# Patient Record
Sex: FEMALE | Race: WHITE | NOT HISPANIC OR LATINO | Employment: FULL TIME | RURAL
[De-identification: names, ages, dates, MRNs, and addresses within clinical notes are randomized per-mention and may not be internally consistent; named-entity substitution may affect disease eponyms.]

---

## 2017-01-18 ENCOUNTER — LAB (OUTPATIENT)
Dept: FAMILY MEDICINE CLINIC | Facility: CLINIC | Age: 57
End: 2017-01-18

## 2017-01-18 DIAGNOSIS — E55.9 UNSPECIFIED VITAMIN D DEFICIENCY: ICD-10-CM

## 2017-01-18 DIAGNOSIS — Z00.00 ROUTINE GENERAL MEDICAL EXAMINATION AT A HEALTH CARE FACILITY: Primary | ICD-10-CM

## 2017-01-19 LAB
25(OH)D3+25(OH)D2 SERPL-MCNC: 44.9 NG/ML (ref 30–100)
ALBUMIN SERPL-MCNC: 4.8 G/DL (ref 3.5–5)
ALBUMIN/GLOB SERPL: 1.5 G/DL (ref 1.1–2.5)
ALP SERPL-CCNC: 74 U/L (ref 24–120)
ALT SERPL-CCNC: 49 U/L (ref 0–54)
AST SERPL-CCNC: 38 U/L (ref 7–45)
BASOPHILS # BLD AUTO: 0.01 10*3/MM3 (ref 0–0.2)
BASOPHILS NFR BLD AUTO: 0.1 % (ref 0–2)
BILIRUB SERPL-MCNC: 0.5 MG/DL (ref 0.1–1)
BUN SERPL-MCNC: 27 MG/DL (ref 5–21)
BUN/CREAT SERPL: 41.5 (ref 7–25)
CALCIUM SERPL-MCNC: 9.8 MG/DL (ref 8.4–10.4)
CHLORIDE SERPL-SCNC: 97 MMOL/L (ref 98–110)
CHOLEST SERPL-MCNC: 180 MG/DL (ref 130–200)
CO2 SERPL-SCNC: 30 MMOL/L (ref 24–31)
CREAT SERPL-MCNC: 0.65 MG/DL (ref 0.5–1.4)
DIFFERENTIAL COMMENT: NORMAL
EOSINOPHIL # BLD AUTO: 0.11 10*3/MM3 (ref 0–0.7)
EOSINOPHIL NFR BLD AUTO: 1.6 % (ref 0–4)
ERYTHROCYTE [DISTWIDTH] IN BLOOD BY AUTOMATED COUNT: 14.2 % (ref 12–15)
GLOBULIN SER CALC-MCNC: 3.1 GM/DL
GLUCOSE SERPL-MCNC: 93 MG/DL (ref 70–100)
HCT VFR BLD AUTO: 45.6 % (ref 37–47)
HDLC SERPL-MCNC: 76 MG/DL
HGB BLD-MCNC: 14.4 G/DL (ref 12–16)
IMM GRANULOCYTES # BLD: 0.01 10*3/MM3 (ref 0–0.03)
IMM GRANULOCYTES NFR BLD: 0.1 % (ref 0–5)
LDLC SERPL CALC-MCNC: 92 MG/DL (ref 0–99)
LYMPHOCYTES # BLD AUTO: 2.89 10*3/MM3 (ref 0.72–4.86)
LYMPHOCYTES NFR BLD AUTO: 43.1 % (ref 15–45)
MCH RBC QN AUTO: 29.5 PG (ref 28–32)
MCHC RBC AUTO-ENTMCNC: 31.6 G/DL (ref 33–36)
MCV RBC AUTO: 93.4 FL (ref 82–98)
MONOCYTES # BLD AUTO: 0.43 10*3/MM3 (ref 0.19–1.3)
MONOCYTES NFR BLD AUTO: 6.4 % (ref 4–12)
NEUTROPHILS # BLD AUTO: 3.25 10*3/MM3 (ref 1.87–8.4)
NEUTROPHILS NFR BLD AUTO: 48.7 % (ref 39–78)
NRBC BLD AUTO-RTO: 0 /100 WBC (ref 0–0)
PLATELET # BLD AUTO: 283 10*3/MM3 (ref 130–400)
PLATELET BLD QL SMEAR: NORMAL
POTASSIUM SERPL-SCNC: 5.2 MMOL/L (ref 3.5–5.3)
PROT SERPL-MCNC: 7.9 G/DL (ref 6.3–8.7)
RBC # BLD AUTO: 4.88 10*6/MM3 (ref 4.2–5.4)
RBC MORPH BLD: NORMAL
SODIUM SERPL-SCNC: 139 MMOL/L (ref 135–145)
T4 FREE SERPL-MCNC: 1.25 NG/DL (ref 0.78–2.19)
TRIGL SERPL-MCNC: 62 MG/DL (ref 0–149)
TSH SERPL DL<=0.005 MIU/L-ACNC: 2.18 MIU/ML (ref 0.47–4.68)
VLDLC SERPL CALC-MCNC: 12.4 MG/DL
WBC # BLD AUTO: 6.7 10*3/MM3 (ref 4.8–10.8)

## 2017-01-27 ENCOUNTER — OFFICE VISIT (OUTPATIENT)
Dept: FAMILY MEDICINE CLINIC | Facility: CLINIC | Age: 57
End: 2017-01-27

## 2017-01-27 VITALS
WEIGHT: 162.2 LBS | HEIGHT: 63 IN | HEART RATE: 76 BPM | DIASTOLIC BLOOD PRESSURE: 77 MMHG | OXYGEN SATURATION: 98 % | TEMPERATURE: 97.7 F | SYSTOLIC BLOOD PRESSURE: 125 MMHG | BODY MASS INDEX: 28.74 KG/M2

## 2017-01-27 DIAGNOSIS — Z12.31 SCREENING MAMMOGRAM, ENCOUNTER FOR: ICD-10-CM

## 2017-01-27 DIAGNOSIS — Z86.79 HISTORY OF RHEUMATIC FEVER: ICD-10-CM

## 2017-01-27 DIAGNOSIS — Z80.0 FAMILY HISTORY OF COLON CANCER: ICD-10-CM

## 2017-01-27 DIAGNOSIS — Z00.00 ANNUAL PHYSICAL EXAM: Primary | ICD-10-CM

## 2017-01-27 PROBLEM — N95.2 POSTMENOPAUSAL ATROPHIC VAGINITIS: Status: ACTIVE | Noted: 2017-01-27

## 2017-01-27 PROBLEM — E78.2 MIXED HYPERLIPIDEMIA: Status: ACTIVE | Noted: 2017-01-27

## 2017-01-27 PROBLEM — I10 ESSENTIAL HYPERTENSION: Status: ACTIVE | Noted: 2017-01-27

## 2017-01-27 PROBLEM — M41.20 IDIOPATHIC SCOLIOSIS: Status: ACTIVE | Noted: 2017-01-27

## 2017-01-27 LAB
BILIRUB BLD-MCNC: NEGATIVE MG/DL
CLARITY, POC: CLEAR
COLOR UR: YELLOW
GLUCOSE UR STRIP-MCNC: NEGATIVE MG/DL
KETONES UR QL: NEGATIVE
LEUKOCYTE EST, POC: NEGATIVE
NITRITE UR-MCNC: NEGATIVE MG/ML
PH UR: 6.5 [PH] (ref 5–8)
PROT UR STRIP-MCNC: NEGATIVE MG/DL
RBC # UR STRIP: NEGATIVE /UL
SP GR UR: 1.01 (ref 1–1.03)
UROBILINOGEN UR QL: NORMAL

## 2017-01-27 PROCEDURE — 81003 URINALYSIS AUTO W/O SCOPE: CPT | Performed by: FAMILY MEDICINE

## 2017-01-27 PROCEDURE — 99396 PREV VISIT EST AGE 40-64: CPT | Performed by: FAMILY MEDICINE

## 2017-01-27 RX ORDER — OMEPRAZOLE 20 MG/1
CAPSULE, DELAYED RELEASE ORAL
COMMUNITY
Start: 2016-11-03 | End: 2018-03-29 | Stop reason: SDUPTHER

## 2017-01-27 RX ORDER — LISINOPRIL 5 MG/1
TABLET ORAL
COMMUNITY
Start: 2016-12-24 | End: 2017-03-22 | Stop reason: SDUPTHER

## 2017-01-27 RX ORDER — PRAVASTATIN SODIUM 20 MG
TABLET ORAL
COMMUNITY
Start: 2017-01-06 | End: 2017-04-04 | Stop reason: SDUPTHER

## 2017-01-27 RX ORDER — ESTRADIOL 10 UG/1
INSERT VAGINAL
COMMUNITY
Start: 2016-10-30 | End: 2017-12-29 | Stop reason: SDUPTHER

## 2017-01-27 RX ORDER — METHOCARBAMOL 500 MG/1
500 TABLET, FILM COATED ORAL
COMMUNITY
End: 2017-03-06 | Stop reason: SDUPTHER

## 2017-01-27 RX ORDER — MELATONIN
1000 DAILY
COMMUNITY

## 2017-01-27 RX ORDER — CHROMIUM 200 MCG
TABLET ORAL
COMMUNITY
End: 2018-05-04

## 2017-01-27 RX ORDER — MECLIZINE HYDROCHLORIDE 25 MG/1
TABLET ORAL
COMMUNITY
Start: 2016-11-02 | End: 2017-06-20 | Stop reason: SDUPTHER

## 2017-01-27 NOTE — MR AVS SNAPSHOT
Ronit Robbins   1/27/2017 1:00 PM   Office Visit    Dept Phone:  [phone]   Encounter #:  [id number]    Provider:  Dmitriy Awad MD   Department:  [facility]                Your Full Care Plan              Your Updated Medication List          This list is accurate as of: 1/27/17  1:37 PM.  Always use your most recent med list.                cholecalciferol 1000 UNITS tablet   Commonly known as:  VITAMIN D3       Chromium 200 MCG tablet       lisinopril 5 MG tablet   Commonly known as:  PRINIVIL,ZESTRIL       meclizine 25 MG tablet   Commonly known as:  ANTIVERT       metFORMIN 500 MG tablet   Commonly known as:  GLUCOPHAGE       methocarbamol 500 MG tablet   Commonly known as:  ROBAXIN       omeprazole 20 MG capsule   Commonly known as:  priLOSEC       pravastatin 20 MG tablet   Commonly known as:  PRAVACHOL       VAGIFEM 10 MCG tablet vaginal tablet   Generic drug:  estradiol               We Performed the Following     Ambulatory Referral to Cardiology     Ambulatory Referral to General Surgery     POC Urinalysis Dipstick, Automated       You Were Diagnosed With        Codes Comments    Annual physical exam    -  Primary ICD-10-CM: Z00.00  ICD-9-CM: V70.0     Family history of colon cancer     ICD-10-CM: Z80.0  ICD-9-CM: V16.0     History of rheumatic fever     ICD-10-CM: Z86.79  ICD-9-CM: V12.09       Instructions      Preventive Care for Adults, Female  A healthy lifestyle and preventive care can promote health and wellness. Preventive health guidelines for women include the following key practices.  · A routine yearly physical is a good way to check with your health care provider about your health and preventive screening. It is a chance to share any concerns and updates on your health and to receive a thorough exam.  · Visit your dentist for a routine exam and preventive care every 6 months. Brush your teeth twice a day and floss once a  day. Good oral hygiene prevents tooth decay and gum disease.  · The frequency of eye exams is based on your age, health, family medical history, use of contact lenses, and other factors. Follow your health care provider's recommendations for frequency of eye exams.  · Eat a healthy diet. Foods like vegetables, fruits, whole grains, low-fat dairy products, and lean protein foods contain the nutrients you need without too many calories. Decrease your intake of foods high in solid fats, added sugars, and salt. Eat the right amount of calories for you. Get information about a proper diet from your health care provider, if necessary.  · Regular physical exercise is one of the most important things you can do for your health. Most adults should get at least 150 minutes of moderate-intensity exercise (any activity that increases your heart rate and causes you to sweat) each week. In addition, most adults need muscle-strengthening exercises on 2 or more days a week.  · Maintain a healthy weight. The body mass index (BMI) is a screening tool to identify possible weight problems. It provides an estimate of body fat based on height and weight. Your health care provider can find your BMI and can help you achieve or maintain a healthy weight. For adults 20 years and older:    A BMI below 18.5 is considered underweight.    A BMI of 18.5 to 24.9 is normal.    A BMI of 25 to 29.9 is considered overweight.    A BMI of 30 and above is considered obese.  · Maintain normal blood lipids and cholesterol levels by exercising and minimizing your intake of saturated fat. Eat a balanced diet with plenty of fruit and vegetables. Blood tests for lipids and cholesterol should begin at age 20 and be repeated every 5 years. If your lipid or cholesterol levels are high, you are over 50, or you are at high risk for heart disease, you may need your cholesterol levels checked more frequently. Ongoing high lipid and cholesterol levels should be  treated with medicines if diet and exercise are not working.  · If you smoke, find out from your health care provider how to quit. If you do not use tobacco, do not start.  · Lung cancer screening is recommended for adults aged 55-80 years who are at high risk for developing lung cancer because of a history of smoking. A yearly low-dose CT scan of the lungs is recommended for people who have at least a 30-pack-year history of smoking and are a current smoker or have quit within the past 15 years. A pack year of smoking is smoking an average of 1 pack of cigarettes a day for 1 year (for example: 1 pack a day for 30 years or 2 packs a day for 15 years). Yearly screening should continue until the smoker has stopped smoking for at least 15 years. Yearly screening should be stopped for people who develop a health problem that would prevent them from having lung cancer treatment.  · If you are pregnant, do not drink alcohol. If you are breastfeeding, be very cautious about drinking alcohol. If you are not pregnant and choose to drink alcohol, do not have more than 1 drink per day. One drink is considered to be 12 ounces (355 mL) of beer, 5 ounces (148 mL) of wine, or 1.5 ounces (44 mL) of liquor.  · Avoid use of street drugs. Do not share needles with anyone. Ask for help if you need support or instructions about stopping the use of drugs.  · High blood pressure causes heart disease and increases the risk of stroke. Your blood pressure should be checked at least every 1 to 2 years. Ongoing high blood pressure should be treated with medicines if weight loss and exercise do not work.  · If you are 55-79 years old, ask your health care provider if you should take aspirin to prevent strokes.  · Diabetes screening is done by taking a blood sample to check your blood glucose level after you have not eaten for a certain period of time (fasting). If you are not overweight and you do not have risk factors for diabetes, you should  "be screened once every 3 years starting at age 45. If you are overweight or obese and you are 40-70 years of age, you should be screened for diabetes every year as part of your cardiovascular risk assessment.  · Breast cancer screening is essential preventive care for women. You should practice \"breast self-awareness.\" This means understanding the normal appearance and feel of your breasts and may include breast self-examination. Any changes detected, no matter how small, should be reported to a health care provider. Women in their 20s and 30s should have a clinical breast exam (CBE) by a health care provider as part of a regular health exam every 1 to 3 years. After age 40, women should have a CBE every year. Starting at age 40, women should consider having a mammogram (breast X-ray test) every year. Women who have a family history of breast cancer should talk to their health care provider about genetic screening. Women at a high risk of breast cancer should talk to their health care providers about having an MRI and a mammogram every year.  · Breast cancer gene (BRCA)-related cancer risk assessment is recommended for women who have family members with BRCA-related cancers. BRCA-related cancers include breast, ovarian, tubal, and peritoneal cancers. Having family members with these cancers may be associated with an increased risk for harmful changes (mutations) in the breast cancer genes BRCA1 and BRCA2. Results of the assessment will determine the need for genetic counseling and BRCA1 and BRCA2 testing.  · Your health care provider may recommend that you be screened regularly for cancer of the pelvic organs (ovaries, uterus, and vagina). This screening involves a pelvic examination, including checking for microscopic changes to the surface of your cervix (Pap test). You may be encouraged to have this screening done every 3 years, beginning at age 21.    For women ages 30-65, health care providers may recommend " pelvic exams and Pap testing every 3 years, or they may recommend the Pap and pelvic exam, combined with testing for human papilloma virus (HPV), every 5 years. Some types of HPV increase your risk of cervical cancer. Testing for HPV may also be done on women of any age with unclear Pap test results.    Other health care providers may not recommend any screening for nonpregnant women who are considered low risk for pelvic cancer and who do not have symptoms. Ask your health care provider if a screening pelvic exam is right for you.  · If you have had past treatment for cervical cancer or a condition that could lead to cancer, you need Pap tests and screening for cancer for at least 20 years after your treatment. If Pap tests have been discontinued, your risk factors (such as having a new sexual partner) need to be reassessed to determine if screening should resume. Some women have medical problems that increase the chance of getting cervical cancer. In these cases, your health care provider may recommend more frequent screening and Pap tests.  · Colorectal cancer can be detected and often prevented. Most routine colorectal cancer screening begins at the age of 50 years and continues through age 75 years. However, your health care provider may recommend screening at an earlier age if you have risk factors for colon cancer. On a yearly basis, your health care provider may provide home test kits to check for hidden blood in the stool. Use of a small camera at the end of a tube, to directly examine the colon (sigmoidoscopy or colonoscopy), can detect the earliest forms of colorectal cancer. Talk to your health care provider about this at age 50, when routine screening begins.  Direct exam of the colon should be repeated every 5-10 years through age 75 years, unless early forms of precancerous polyps or small growths are found.  · People who are at an increased risk for hepatitis B should be screened for this virus. You  are considered at high risk for hepatitis B if:    You were born in a country where hepatitis B occurs often. Talk with your health care provider about which countries are considered high risk.    Your parents were born in a high-risk country and you have not received a shot to protect against hepatitis B (hepatitis B vaccine).    You have HIV or AIDS.    You use needles to inject street drugs.    You live with, or have sex with, someone who has hepatitis B.    You get hemodialysis treatment.    You take certain medicines for conditions like cancer, organ transplantation, and autoimmune conditions.  · Hepatitis C blood testing is recommended for all people born from 1945 through 1965 and any individual with known risks for hepatitis C.  · Practice safe sex. Use condoms and avoid high-risk sexual practices to reduce the spread of sexually transmitted infections (STIs). STIs include gonorrhea, chlamydia, syphilis, trichomonas, herpes, HPV, and human immunodeficiency virus (HIV). Herpes, HIV, and HPV are viral illnesses that have no cure. They can result in disability, cancer, and death.  · You should be screened for sexually transmitted illnesses (STIs) including gonorrhea and chlamydia if:    You are sexually active and are younger than 24 years.    You are older than 24 years and your health care provider tells you that you are at risk for this type of infection.    Your sexual activity has changed since you were last screened and you are at an increased risk for chlamydia or gonorrhea. Ask your health care provider if you are at risk.  · If you are at risk of being infected with HIV, it is recommended that you take a prescription medicine daily to prevent HIV infection. This is called preexposure prophylaxis (PrEP). You are considered at risk if:    You are sexually active and do not regularly use condoms or know the HIV status of your partner(s).    You take drugs by injection.    You are sexually active with a  partner who has HIV.    Talk with your health care provider about whether you are at high risk of being infected with HIV. If you choose to begin PrEP, you should first be tested for HIV. You should then be tested every 3 months for as long as you are taking PrEP.  · Osteoporosis is a disease in which the bones lose minerals and strength with aging. This can result in serious bone fractures or breaks. The risk of osteoporosis can be identified using a bone density scan. Women ages 65 years and over and women at risk for fractures or osteoporosis should discuss screening with their health care providers. Ask your health care provider whether you should take a calcium supplement or vitamin D to reduce the rate of osteoporosis.  · Menopause can be associated with physical symptoms and risks. Hormone replacement therapy is available to decrease symptoms and risks. You should talk to your health care provider about whether hormone replacement therapy is right for you.  · Use sunscreen. Apply sunscreen liberally and repeatedly throughout the day. You should seek shade when your shadow is shorter than you. Protect yourself by wearing long sleeves, pants, a wide-brimmed hat, and sunglasses year round, whenever you are outdoors.  · Once a month, do a whole body skin exam, using a mirror to look at the skin on your back. Tell your health care provider of new moles, moles that have irregular borders, moles that are larger than a pencil eraser, or moles that have changed in shape or color.  · Stay current with required vaccines (immunizations).    Influenza vaccine. All adults should be immunized every year.    Tetanus, diphtheria, and acellular pertussis (Td, Tdap) vaccine. Pregnant women should receive 1 dose of Tdap vaccine during each pregnancy. The dose should be obtained regardless of the length of time since the last dose. Immunization is preferred during the 27th-36th week of gestation. An adult who has not previously  received Tdap or who does not know her vaccine status should receive 1 dose of Tdap. This initial dose should be followed by tetanus and diphtheria toxoids (Td) booster doses every 10 years. Adults with an unknown or incomplete history of completing a 3-dose immunization series with Td-containing vaccines should begin or complete a primary immunization series including a Tdap dose. Adults should receive a Td booster every 10 years.    Varicella vaccine. An adult without evidence of immunity to varicella should receive 2 doses or a second dose if she has previously received 1 dose. Pregnant females who do not have evidence of immunity should receive the first dose after pregnancy. This first dose should be obtained before leaving the health care facility. The second dose should be obtained 4-8 weeks after the first dose.    Human papillomavirus (HPV) vaccine. Females aged 13-26 years who have not received the vaccine previously should obtain the 3-dose series. The vaccine is not recommended for use in pregnant females. However, pregnancy testing is not needed before receiving a dose. If a female is found to be pregnant after receiving a dose, no treatment is needed. In that case, the remaining doses should be delayed until after the pregnancy. Immunization is recommended for any person with an immunocompromised condition through the age of 26 years if she did not get any or all doses earlier. During the 3-dose series, the second dose should be obtained 4-8 weeks after the first dose. The third dose should be obtained 24 weeks after the first dose and 16 weeks after the second dose.    Zoster vaccine. One dose is recommended for adults aged 60 years or older unless certain conditions are present.    Measles, mumps, and rubella (MMR) vaccine. Adults born before 1957 generally are considered immune to measles and mumps. Adults born in 1957 or later should have 1 or more doses of MMR vaccine unless there is a  contraindication to the vaccine or there is laboratory evidence of immunity to each of the three diseases. A routine second dose of MMR vaccine should be obtained at least 28 days after the first dose for students attending postsecondary schools, health care workers, or international travelers. People who received inactivated measles vaccine or an unknown type of measles vaccine during [id number] should receive 2 doses of MMR vaccine. People who received inactivated mumps vaccine or an unknown type of mumps vaccine before 1979 and are at high risk for mumps infection should consider immunization with 2 doses of MMR vaccine. For females of childbearing age, rubella immunity should be determined. If there is no evidence of immunity, females who are not pregnant should be vaccinated. If there is no evidence of immunity, females who are pregnant should delay immunization until after pregnancy. Unvaccinated health care workers born before 1957 who lack laboratory evidence of measles, mumps, or rubella immunity or laboratory confirmation of disease should consider measles and mumps immunization with 2 doses of MMR vaccine or rubella immunization with 1 dose of MMR vaccine.    Pneumococcal 13-valent conjugate (PCV13) vaccine. When indicated, a person who is uncertain of his immunization history and has no record of immunization should receive the PCV13 vaccine. All adults 65 years of age and older should receive this vaccine. An adult aged 19 years or older who has certain medical conditions and has not been previously immunized should receive 1 dose of PCV13 vaccine. This PCV13 should be followed with a dose of pneumococcal polysaccharide (PPSV23) vaccine. Adults who are at high risk for pneumococcal disease should obtain the PPSV23 vaccine at least 8 weeks after the dose of PCV13 vaccine. Adults older than 65 years of age who have normal immune system function should obtain the PPSV23 vaccine dose at least 1 year after  the dose of PCV13 vaccine.    Pneumococcal polysaccharide (PPSV23) vaccine. When PCV13 is also indicated, PCV13 should be obtained first. All adults aged 65 years and older should be immunized. An adult younger than age 65 years who has certain medical conditions should be immunized. Any person who resides in a nursing home or long-term care facility should be immunized. An adult smoker should be immunized. People with an immunocompromised condition and certain other conditions should receive both PCV13 and PPSV23 vaccines. People with human immunodeficiency virus (HIV) infection should be immunized as soon as possible after diagnosis. Immunization during chemotherapy or radiation therapy should be avoided. Routine use of PPSV23 vaccine is not recommended for American Indians, [place] Natives, or people younger than 65 years unless there are medical conditions that require PPSV23 vaccine. When indicated, people who have unknown immunization and have no record of immunization should receive PPSV23 vaccine. One-time revaccination 5 years after the first dose of PPSV23 is recommended for people aged 19-64 years who have chronic kidney failure, nephrotic syndrome, asplenia, or immunocompromised conditions. People who received 1-2 doses of PPSV23 before age 65 years should receive another dose of PPSV23 vaccine at age 65 years or later if at least 5 years have passed since the previous dose. Doses of PPSV23 are not needed for people immunized with PPSV23 at or after age 65 years.    Meningococcal vaccine. Adults with asplenia or persistent complement component deficiencies should receive 2 doses of quadrivalent meningococcal conjugate (MenACWY-D) vaccine. The doses should be obtained at least 2 months apart. Microbiologists working with certain meningococcal bacteria,  recruits, people at risk during an outbreak, and people who travel to or live in countries with a high rate of meningitis should be immunized. A  first-year college student up through age 21 years who is living in a residence renee should receive a dose if she did not receive a dose on or after her 16th birthday. Adults who have certain high-risk conditions should receive one or more doses of vaccine.    Hepatitis A vaccine. Adults who wish to be protected from this disease, have certain high-risk conditions, work with hepatitis A-infected animals, work in hepatitis A research labs, or travel to or work in countries with a high rate of hepatitis A should be immunized. Adults who were previously unvaccinated and who anticipate close contact with an international adoptee during the first 60 days after arrival in the [place] from a country with a high rate of hepatitis A should be immunized.    Hepatitis B vaccine. Adults who wish to be protected from this disease, have certain high-risk conditions, may be exposed to blood or other infectious body fluids, are household contacts or sex partners of hepatitis B positive people, are clients or workers in certain care facilities, or travel to or work in countries with a high rate of hepatitis B should be immunized.    Haemophilus influenzae type b (Hib) vaccine. A previously unvaccinated person with asplenia or sickle cell disease or having a scheduled splenectomy should receive 1 dose of Hib vaccine. Regardless of previous immunization, a recipient of a hematopoietic stem cell transplant should receive a 3-dose series 6-12 months after her successful transplant. Hib vaccine is not recommended for adults with HIV infection.  Preventive Services / Frequency  Ages 19 to 39 years  · Blood pressure check.** / Every 3-5 years.  · Lipid and cholesterol check.** / Every 5 years beginning at age 20.  · Clinical breast exam.** / Every 3 years for women in their 20s and 30s.  · BRCA-related cancer risk assessment.** / For women who have family members with a BRCA-related cancer (breast, ovarian, tubal, or peritoneal  cancers).  · Pap test.** / Every 2 years from ages 21 through 29. Every 3 years starting at age 30 through age 65 or 70 with a history of 3 consecutive normal Pap tests.  · HPV screening.** / Every 3 years from ages 30 through ages 65 to 70 with a history of 3 consecutive normal Pap tests.  · Hepatitis C blood test.** / For any individual with known risks for hepatitis C.  · Skin self-exam. / Monthly.  · Influenza vaccine. / Every year.  · Tetanus, diphtheria, and acellular pertussis (Tdap, Td) vaccine.** / Consult your health care provider. Pregnant women should receive 1 dose of Tdap vaccine during each pregnancy. 1 dose of Td every 10 years.  · Varicella vaccine.** / Consult your health care provider. Pregnant females who do not have evidence of immunity should receive the first dose after pregnancy.  · HPV vaccine. / 3 doses over 6 months, if 26 and younger. The vaccine is not recommended for use in pregnant females. However, pregnancy testing is not needed before receiving a dose.  · Measles, mumps, rubella (MMR) vaccine.** / You need at least 1 dose of MMR if you were born in 1957 or later. You may also need a 2nd dose. For females of childbearing age, rubella immunity should be determined. If there is no evidence of immunity, females who are not pregnant should be vaccinated. If there is no evidence of immunity, females who are pregnant should delay immunization until after pregnancy.  · Pneumococcal 13-valent conjugate (PCV13) vaccine.** / Consult your health care provider.  · Pneumococcal polysaccharide (PPSV23) vaccine.** / 1 to 2 doses if you smoke cigarettes or if you have certain conditions.  · Meningococcal vaccine.** / 1 dose if you are age 19 to 21 years and a first-year college student living in a residence renee, or have one of several medical conditions, you need to get vaccinated against meningococcal disease. You may also need additional booster doses.  · Hepatitis A vaccine.** / Consult your  health care provider.  · Hepatitis B vaccine.** / Consult your health care provider.  · Haemophilus influenzae type b (Hib) vaccine.** / Consult your health care provider.  Ages 40 to 64 years  · Blood pressure check.** / Every year.  · Lipid and cholesterol check.** / Every 5 years beginning at age 20 years.  · Lung cancer screening. / Every year if you are aged 55-80 years and have a 30-pack-year history of smoking and currently smoke or have quit within the past 15 years. Yearly screening is stopped once you have quit smoking for at least 15 years or develop a health problem that would prevent you from having lung cancer treatment.  · Clinical breast exam.** / Every year after age 40 years.  ·  BRCA-related cancer risk assessment.** / For women who have family members with a BRCA-related cancer (breast, ovarian, tubal, or peritoneal cancers).  · Mammogram.** / Every year beginning at age 40 years and continuing for as long as you are in good health. Consult with your health care provider.  · Pap test.** / Every 3 years starting at age 30 years through age 65 or 70 years with a history of 3 consecutive normal Pap tests.  · HPV screening.** / Every 3 years from ages 30 years through ages 65 to 70 years with a history of 3 consecutive normal Pap tests.  · Fecal occult blood test (FOBT) of stool. / Every year beginning at age 50 years and continuing until age 75 years. You may not need to do this test if you get a colonoscopy every 10 years.  · Flexible sigmoidoscopy or colonoscopy.** / Every 5 years for a flexible sigmoidoscopy or every 10 years for a colonoscopy beginning at age 50 years and continuing until age 75 years.  · Hepatitis C blood test.** / For all people born from 1945 through 1965 and any individual with known risks for hepatitis C.  · Skin self-exam. / Monthly.  · Influenza vaccine. / Every year.  · Tetanus, diphtheria, and acellular pertussis (Tdap/Td) vaccine.** / Consult your health care provider.  Pregnant women should receive 1 dose of Tdap vaccine during each pregnancy. 1 dose of Td every 10 years.  · Varicella vaccine.** / Consult your health care provider. Pregnant females who do not have evidence of immunity should receive the first dose after pregnancy.  · Zoster vaccine.** / 1 dose for adults aged 60 years or older.  · Measles, mumps, rubella (MMR) vaccine.** / You need at least 1 dose of MMR if you were born in 1957 or later. You may also need a second dose. For females of childbearing age, rubella immunity should be determined. If there is no evidence of immunity, females who are not pregnant should be vaccinated. If there is no evidence of immunity, females who are pregnant should delay immunization until after pregnancy.  · Pneumococcal 13-valent conjugate (PCV13) vaccine.** / Consult your health care provider.  · Pneumococcal polysaccharide (PPSV23) vaccine.** / 1 to 2 doses if you smoke cigarettes or if you have certain conditions.  · Meningococcal vaccine.** / Consult your health care provider.  · Hepatitis A vaccine.** / Consult your health care provider.  · Hepatitis B vaccine.** / Consult your health care provider.  · Haemophilus influenzae type b (Hib) vaccine.** / Consult your health care provider.  Ages 65 years and over  · Blood pressure check.** / Every year.  · Lipid and cholesterol check.** / Every 5 years beginning at age 20 years.  · Lung cancer screening. / Every year if you are aged 55-80 years and have a 30-pack-year history of smoking and currently smoke or have quit within the past 15 years. Yearly screening is stopped once you have quit smoking for at least 15 years or develop a health problem that would prevent you from having lung cancer treatment.  · Clinical breast exam.** / Every year after age 40 years.  ·  BRCA-related cancer risk assessment.** / For women who have family members with a BRCA-related cancer (breast, ovarian, tubal, or peritoneal cancers).  · Mammogram.**  / Every year beginning at age 40 years and continuing for as long as you are in good health. Consult with your health care provider.  · Pap test.** / Every 3 years starting at age 30 years through age 65 or 70 years with 3 consecutive normal Pap tests. Testing can be stopped between 65 and 70 years with 3 consecutive normal Pap tests and no abnormal Pap or HPV tests in the past 10 years.  · HPV screening.** / Every 3 years from ages 30 years through ages 65 or 70 years with a history of 3 consecutive normal Pap tests. Testing can be stopped between 65 and 70 years with 3 consecutive normal Pap tests and no abnormal Pap or HPV tests in the past 10 years.  · Fecal occult blood test (FOBT) of stool. / Every year beginning at age 50 years and continuing until age 75 years. You may not need to do this test if you get a colonoscopy every 10 years.  · Flexible sigmoidoscopy or colonoscopy.** / Every 5 years for a flexible sigmoidoscopy or every 10 years for a colonoscopy beginning at age 50 years and continuing until age 75 years.  · Hepatitis C blood test.** / For all people born from 1945 through 1965 and any individual with known risks for hepatitis C.  · Osteoporosis screening.** / A one-time screening for women ages 65 years and over and women at risk for fractures or osteoporosis.  · Skin self-exam. / Monthly.  · Influenza vaccine. / Every year.  · Tetanus, diphtheria, and acellular pertussis (Tdap/Td) vaccine.** / 1 dose of Td every 10 years.  · Varicella vaccine.** / Consult your health care provider.  · Zoster vaccine.** / 1 dose for adults aged 60 years or older.  · Pneumococcal 13-valent conjugate (PCV13) vaccine.** / Consult your health care provider.  · Pneumococcal polysaccharide (PPSV23) vaccine.** / 1 dose for all adults aged 65 years and older.  · Meningococcal vaccine.** / Consult your health care provider.  · Hepatitis A vaccine.** / Consult your health care provider.  · Hepatitis B vaccine.** / Consult  "your health care provider.  · Haemophilus influenzae type b (Hib) vaccine.** / Consult your health care provider.  ** Family history and personal history of risk and conditions may change your health care provider's recommendations.     This information is not intended to replace advice given to you by your health care provider. Make sure you discuss any questions you have with your health care provider.     Document Released: 2003 Document Revised: 2016 Document Reviewed: 2012  Elsevier Interactive Patient Education  Elsevier [employer]       Patient Instructions History      Upcoming Appointments     Visit Type Date Time Department    PHYS[facility] 2017  1:00 PM [facility]     [facility] allows you to send messages to your doctor, view your test results, renew your prescriptions, schedule appointments, and more. To sign up, go to [employer] and click on the Sign Up Now link in the New User? box. Enter your [email or web address] Activation Code exactly as it appears below along with the last four digits of your Social Security Number and your Date of Birth () to complete the sign-up process. If you do not sign up before the expiration date, you must request a new code.    [email or web address] Activation Code: [email or web address]  Expires: 2017  8:25 AM    If you have questions, you can email [email or web address] or call [phone] to talk to our [email or web address] staff. Remember, [email or web address] is NOT to be used for urgent needs. For medical emergencies, dial 911.               Other Info from Your Visit           Allergies     No Known Allergies      Reason for Visit     Annual Exam           Vital Signs     Blood Pressure Pulse Temperature Height Weight Last Menstrual Period    125/77 76 97.7 °F (36.5 °C) 63\" (160 cm) 162 lb 3.2 oz (73.6 kg) (LMP Unknown)    Oxygen Saturation Body Mass Index Smoking Status             98% 28.73 kg/m2 Never Smoker         Problems and " Diagnoses Noted     High blood pressure    Curved spine    Mixed hyperlipidemia    Postmenopausal vaginal dryness    Annual physical exam    -  Primary    Family history of colon cancer        History of rheumatic fever          Results     POC Urinalysis Dipstick, Automated      Component Value Standard Range & Units    Color Yellow Yellow, Straw, Dark Yellow, Ximena    Clarity, UA Clear Clear    Glucose, UA Negative Negative, 1000 mg/dL (3+) mg/dL    Bilirubin Negative Negative    Ketones, UA Negative Negative    Specific Gravity  1.010 1.005 - 1.030    Blood, UA Negative Negative    pH, Urine 6.5 5.0 - 8.0    Protein, POC Negative Negative mg/dL    Urobilinogen, UA Normal Normal    Leukocytes Negative Negative    Nitrite, UA Negative Negative

## 2017-01-27 NOTE — PROGRESS NOTES
Subjective   Ronit Robbins is a 56 y.o. female.     Hyperlipidemia   This is a chronic problem. The current episode started more than 1 year ago. The problem is controlled. Recent lipid tests were reviewed and are normal. There are no known factors aggravating her hyperlipidemia. Pertinent negatives include no chest pain. Current antihyperlipidemic treatment includes statins. The current treatment provides moderate improvement of lipids. There are no compliance problems.  Risk factors for coronary artery disease include obesity.        The following portions of the patient's history were reviewed and updated as appropriate: allergies, current medications, past family history, past medical history, past surgical history and problem list.    Review of Systems   HENT: Negative.    Eyes: Negative.    Respiratory: Negative.    Cardiovascular: Negative for chest pain.   Gastrointestinal: Negative.    Genitourinary: Negative.    Musculoskeletal:        Scoliosis   Neurological: Negative.    Hematological: Negative.    Psychiatric/Behavioral: Negative.        Objective   Physical Exam   Constitutional: She is oriented to person, place, and time. She appears well-developed and well-nourished.   HENT:   Head: Normocephalic.   Right Ear: External ear normal.   Left Ear: External ear normal.   Mouth/Throat: Oropharynx is clear and moist.   Eyes: EOM are normal. Pupils are equal, round, and reactive to light.   Neck:   No neck masses good carotid pulses   Cardiovascular: Normal rate, regular rhythm and normal heart sounds.    Pulmonary/Chest: Effort normal and breath sounds normal.   Abdominal: Soft. Bowel sounds are normal.   No pulsatile mass or organomegaly   Genitourinary:   Genitourinary Comments: Breasts no masses noted   Musculoskeletal: Normal range of motion.   Neurological: She is alert and oriented to person, place, and time.   Skin: Skin is warm and dry.   Psychiatric: She has a normal mood and affect. Her behavior  is normal. Judgment and thought content normal.   Vitals reviewed.      Assessment/Plan   Problems Addressed this Visit     None      Visit Diagnoses     Annual physical exam    -  Primary    Relevant Orders    POC Urinalysis Dipstick, Automated (Completed)    Family history of colon cancer        Relevant Orders    Ambulatory Referral to General Surgery    History of rheumatic fever        Relevant Orders    Ambulatory Referral to Cardiology            Plan continues to fight weight control, history of rheumatic fever-cardiology consult, maternal aunt had colon cancer-referred for colonoscopy 8 years out

## 2017-02-10 LAB — HM MAMMOGRAM: NORMAL

## 2017-03-06 RX ORDER — METHOCARBAMOL 500 MG/1
500 TABLET, FILM COATED ORAL 3 TIMES DAILY
Qty: 270 TABLET | Refills: 2 | Status: SHIPPED | OUTPATIENT
Start: 2017-03-06 | End: 2018-10-16 | Stop reason: SDUPTHER

## 2017-03-22 RX ORDER — LISINOPRIL 5 MG/1
5 TABLET ORAL DAILY
Qty: 90 TABLET | Refills: 2 | Status: SHIPPED | OUTPATIENT
Start: 2017-03-22 | End: 2017-12-17 | Stop reason: SDUPTHER

## 2017-03-24 LAB — HM COLONOSCOPY: NORMAL

## 2017-04-04 RX ORDER — PRAVASTATIN SODIUM 20 MG
20 TABLET ORAL NIGHTLY
Qty: 90 TABLET | Refills: 2 | Status: SHIPPED | OUTPATIENT
Start: 2017-04-04 | End: 2018-01-02 | Stop reason: SDUPTHER

## 2017-06-20 RX ORDER — MECLIZINE HYDROCHLORIDE 25 MG/1
25 TABLET ORAL 3 TIMES DAILY PRN
Qty: 60 TABLET | Refills: 0 | Status: SHIPPED | OUTPATIENT
Start: 2017-06-20 | End: 2018-01-15 | Stop reason: SDUPTHER

## 2017-12-18 RX ORDER — LISINOPRIL 5 MG/1
TABLET ORAL
Qty: 90 TABLET | Refills: 0 | Status: SHIPPED | OUTPATIENT
Start: 2017-12-18 | End: 2018-03-18 | Stop reason: SDUPTHER

## 2017-12-20 ENCOUNTER — TELEPHONE (OUTPATIENT)
Dept: FAMILY MEDICINE CLINIC | Facility: CLINIC | Age: 57
End: 2017-12-20

## 2017-12-20 DIAGNOSIS — M25.531 RIGHT WRIST PAIN: Primary | ICD-10-CM

## 2017-12-20 NOTE — TELEPHONE ENCOUNTER
PT STATES SHE FELL SAT PM-HIT RT [facility]RI[facility] ON FLOOR.  CAN SHE HAVE AN OP XRAY ORDER FAXED RT [facility]?  LIMITED ROM & PAINFUL.  Right wrist x-ray with navicular views

## 2017-12-21 DIAGNOSIS — M25.531 RIGHT WRIST PAIN: ICD-10-CM

## 2017-12-22 ENCOUNTER — OFFICE VISIT (OUTPATIENT)
Dept: FAMILY MEDICINE CLINIC | Facility: CLINIC | Age: 57
End: 2017-12-22

## 2017-12-22 DIAGNOSIS — M25.571 ACUTE RIGHT ANKLE PAIN: Primary | ICD-10-CM

## 2017-12-22 DIAGNOSIS — M25.571 ACUTE RIGHT ANKLE PAIN: ICD-10-CM

## 2017-12-22 PROCEDURE — 99213 OFFICE O/P EST LOW 20 MIN: CPT | Performed by: FAMILY MEDICINE

## 2017-12-22 RX ORDER — HYDROCODONE BITARTRATE AND ACETAMINOPHEN 7.5; 325 MG/1; MG/1
1 TABLET ORAL EVERY 6 HOURS PRN
Qty: 90 TABLET | Refills: 0 | Status: SHIPPED | OUTPATIENT
Start: 2017-12-22 | End: 2018-11-09

## 2017-12-22 NOTE — PROGRESS NOTES
Subjective   Ronit Robbins is a 57 y.o. female.     Lower Extremity Issue   This is a new problem. The current episode started today. The problem occurs constantly. The problem has been unchanged. Associated symptoms comments: By crack, right foot and ankle Twisted externally and in eversion fashion underneath her. She has tried nothing for the symptoms.       The following portions of the patient's history were reviewed and updated as appropriate: allergies, current medications, past family history, past medical history, past social history, past surgical history and problem list.    Review of Systems   Musculoskeletal:        Lateral malleolar tenderness-right knee no instability or tenderness although contusions are present       Objective   Physical Exam   Constitutional: She is oriented to person, place, and time. She appears well-developed and well-nourished.   Musculoskeletal:   Swelling right lateral malleolus, point tenderness over joint space of right ankle-no gross deformity   Neurological: She is alert and oriented to person, place, and time.   Psychiatric: She has a normal mood and affect. Her behavior is normal. Thought content normal.       Assessment/Plan   Diagnoses and all orders for this visit:    Acute right ankle pain  -     XR Foot 3+ View Right; Future  -     XR Ankle 3+ View Right; Future              denies pain other than right ankle and foot-plan- x-rays immobilization ice and non- weightbearing--x-ray showed acute fracture of right lateral malleolus with fractures of the third and fourth basis of metatarsal bones

## 2017-12-26 ENCOUNTER — OFFICE VISIT (OUTPATIENT)
Dept: FAMILY MEDICINE CLINIC | Facility: CLINIC | Age: 57
End: 2017-12-26

## 2017-12-26 VITALS
OXYGEN SATURATION: 98 % | DIASTOLIC BLOOD PRESSURE: 62 MMHG | HEIGHT: 63 IN | TEMPERATURE: 98.6 F | SYSTOLIC BLOOD PRESSURE: 118 MMHG | HEART RATE: 83 BPM

## 2017-12-26 DIAGNOSIS — T14.8XXA FRACTURE: Primary | ICD-10-CM

## 2017-12-26 PROCEDURE — 99214 OFFICE O/P EST MOD 30 MIN: CPT | Performed by: FAMILY MEDICINE

## 2017-12-26 NOTE — PROGRESS NOTES
Subjective   Ronit Robbins is a 57 y.o. female.     Lower Extremity Issue   This is a new problem. The current episode started in the past 7 days. The problem occurs constantly. The problem has been unchanged. Associated symptoms comments: Fall, fractured third fourthof metatarsals and fibula on right. She has tried ice, oral narcotics and rest for the symptoms. The treatment provided mild relief.       The following portions of the patient's history were reviewed and updated as appropriate: allergies, current medications, past family history, past medical history, past social history, past surgical history and problem list.    Review of Systems   Cardiovascular: Positive for leg swelling.   Musculoskeletal:        Pain and swelling right foot and ankle       Objective   Physical Exam   Constitutional: She is oriented to person, place, and time.   Musculoskeletal: She exhibits edema.   Swelling pain in right foot and ankle   Neurological: She is alert and oriented to person, place, and time.   Psychiatric: She has a normal mood and affect. Her behavior is normal. Judgment and thought content normal.   Nursing note and vitals reviewed.      Assessment/Plan   Ronit was seen today for follow-up.    Diagnoses and all orders for this visit:    Fracture  -     Ambulatory Referral to Orthopedic Surgery           Plan orthopedic consult, no work, nonweightbearing, continue boot, 2 aspirin a day and scooter around the house

## 2017-12-29 RX ORDER — ESTRADIOL 10 UG/1
1 INSERT VAGINAL 3 TIMES WEEKLY
Qty: 45 TABLET | Refills: 1 | Status: SHIPPED | OUTPATIENT
Start: 2017-12-29 | End: 2018-06-27 | Stop reason: SDUPTHER

## 2018-01-02 RX ORDER — PRAVASTATIN SODIUM 20 MG
TABLET ORAL
Qty: 90 TABLET | Refills: 0 | Status: SHIPPED | OUTPATIENT
Start: 2018-01-02 | End: 2018-03-30 | Stop reason: SDUPTHER

## 2018-01-15 RX ORDER — MECLIZINE HYDROCHLORIDE 25 MG/1
25 TABLET ORAL 3 TIMES DAILY PRN
Qty: 60 TABLET | Refills: 1 | Status: SHIPPED | OUTPATIENT
Start: 2018-01-15 | End: 2018-05-04

## 2018-03-19 RX ORDER — LISINOPRIL 5 MG/1
TABLET ORAL
Qty: 90 TABLET | Refills: 0 | Status: SHIPPED | OUTPATIENT
Start: 2018-03-19 | End: 2018-06-15 | Stop reason: SDUPTHER

## 2018-03-29 RX ORDER — OMEPRAZOLE 20 MG/1
20 CAPSULE, DELAYED RELEASE ORAL DAILY
Qty: 90 CAPSULE | Refills: 0 | Status: SHIPPED | OUTPATIENT
Start: 2018-03-29 | End: 2018-06-27 | Stop reason: SDUPTHER

## 2018-03-29 NOTE — TELEPHONE ENCOUNTER
miguel sent to old EMR, transferred into [facility].  Patient is due CPE.  She is aware and will contact to schedule

## 2018-04-02 RX ORDER — PRAVASTATIN SODIUM 20 MG
TABLET ORAL
Qty: 90 TABLET | Refills: 0 | Status: SHIPPED | OUTPATIENT
Start: 2018-04-02 | End: 2018-07-01 | Stop reason: SDUPTHER

## 2018-05-04 ENCOUNTER — OFFICE VISIT (OUTPATIENT)
Dept: FAMILY MEDICINE CLINIC | Facility: CLINIC | Age: 58
End: 2018-05-04

## 2018-05-04 VITALS
DIASTOLIC BLOOD PRESSURE: 70 MMHG | TEMPERATURE: 99 F | HEIGHT: 63 IN | SYSTOLIC BLOOD PRESSURE: 114 MMHG | HEART RATE: 78 BPM | BODY MASS INDEX: 28.17 KG/M2 | WEIGHT: 159 LBS | OXYGEN SATURATION: 98 %

## 2018-05-04 DIAGNOSIS — Z12.12 SCREENING FOR MALIGNANT NEOPLASM OF THE RECTUM: ICD-10-CM

## 2018-05-04 DIAGNOSIS — Z23 NEED FOR PROPHYLACTIC VACCINATION WITH COMBINED DIPHTHERIA-TETANUS-PERTUSSIS (DTP) VACCINE: ICD-10-CM

## 2018-05-04 DIAGNOSIS — Z12.31 ENCOUNTER FOR SCREENING MAMMOGRAM FOR MALIGNANT NEOPLASM OF BREAST: Primary | ICD-10-CM

## 2018-05-04 DIAGNOSIS — R10.13 EPIGASTRIC PAIN: ICD-10-CM

## 2018-05-04 DIAGNOSIS — Z00.00 ANNUAL PHYSICAL EXAM: ICD-10-CM

## 2018-05-04 DIAGNOSIS — N95.1 SYMPTOMATIC MENOPAUSAL OR FEMALE CLIMACTERIC STATES: ICD-10-CM

## 2018-05-04 LAB
BILIRUB BLD-MCNC: ABNORMAL MG/DL
CLARITY, POC: CLEAR
COLOR UR: YELLOW
GLUCOSE UR STRIP-MCNC: NEGATIVE MG/DL
KETONES UR QL: NEGATIVE
LEUKOCYTE EST, POC: NEGATIVE
NITRITE UR-MCNC: NEGATIVE MG/ML
PH UR: 5.5 [PH] (ref 5–8)
PROT UR STRIP-MCNC: NEGATIVE MG/DL
RBC # UR STRIP: NEGATIVE /UL
SP GR UR: 1.02 (ref 1–1.03)
UROBILINOGEN UR QL: NORMAL

## 2018-05-04 PROCEDURE — 99396 PREV VISIT EST AGE 40-64: CPT | Performed by: FAMILY MEDICINE

## 2018-05-04 PROCEDURE — 81003 URINALYSIS AUTO W/O SCOPE: CPT | Performed by: FAMILY MEDICINE

## 2018-05-04 PROCEDURE — 90471 IMMUNIZATION ADMIN: CPT | Performed by: FAMILY MEDICINE

## 2018-05-04 PROCEDURE — 90715 TDAP VACCINE 7 YRS/> IM: CPT | Performed by: FAMILY MEDICINE

## 2018-05-04 RX ORDER — ONDANSETRON 4 MG/1
4 TABLET, FILM COATED ORAL EVERY 8 HOURS PRN
Qty: 10 TABLET | Refills: 1 | Status: SHIPPED | OUTPATIENT
Start: 2018-05-04 | End: 2018-11-09

## 2018-05-04 RX ORDER — MECLIZINE HCL 25MG 25 MG/1
25 TABLET, CHEWABLE ORAL 3 TIMES DAILY PRN
COMMUNITY
End: 2019-03-11 | Stop reason: SDUPTHER

## 2018-05-04 RX ORDER — GABAPENTIN 300 MG/1
300 CAPSULE ORAL NIGHTLY
COMMUNITY
End: 2018-06-04 | Stop reason: SDUPTHER

## 2018-05-04 NOTE — PATIENT INSTRUCTIONS
Mediterranean Diet  A Mediterranean diet refers to food and lifestyle choices that are based on the traditions of countries located on the [place]. This way of eating has been shown to help prevent certain conditions and improve outcomes for people who have chronic diseases, like kidney disease and heart disease.  What are tips for following this plan?  Lifestyle   · Cook and eat meals together with your family, when possible.  · Drink enough fluid to keep your urine clear or pale yellow.  · Be physically active every day. This includes:  ¨ Aerobic exercise like running or swimming.  ¨ Leisure activities like gardening, walking, or housework.  · Get 7-8 hours of sleep each night.  · If recommended by your health care provider, drink red wine in moderation. This means 1 glass a day for nonpregnant women and 2 glasses a day for men. A glass of wine equals 5 oz (150 mL).  Reading food labels   · Check the serving size of packaged foods. For foods such as rice and pasta, the serving size refers to the amount of cooked product, not dry.  · Check the total fat in packaged foods. Avoid foods that have saturated fat or trans fats.  · Check the ingredients list for added sugars, such as corn syrup.  Shopping   · At the grocery store, buy most of your food from the areas near the walls of the store. This includes:  ¨ Fresh fruits and vegetables (produce).  ¨ Grains, beans, nuts, and seeds. Some of these may be available in unpackaged forms or large amounts (in bulk).  ¨ Fresh seafood.  ¨ Poultry and eggs.  ¨ Low-fat dairy products.  · Buy whole ingredients instead of prepackaged foods.  · Buy fresh fruits and vegetables in-season from local farmers markets.  · Buy frozen fruits and vegetables in resealable bags.  · If you do not have access to quality fresh seafood, buy precooked frozen shrimp or canned fish, such as tuna, salmon, or sardines.  · Buy small amounts of raw or cooked vegetables, salads, or olives from  the deli or salad bar at your store.  · Stock your pantry so you always have certain foods on hand, such as olive oil, canned tuna, canned tomatoes, rice, pasta, and beans.  Cooking   · Cook foods with extra-virgin olive oil instead of using butter or other vegetable oils.  · Have meat as a side dish, and have vegetables or grains as your main dish. This means having meat in small portions or adding small amounts of meat to foods like pasta or stew.  · Use beans or vegetables instead of meat in common dishes like chili or lasagna.  · [place] with different cooking methods. Try roasting or broiling vegetables instead of steaming or sautéeing them.  · Add frozen vegetables to soups, stews, pasta, or rice.  · Add nuts or seeds for added healthy fat at each meal. You can add these to yogurt, salads, or vegetable dishes.  · Marinate fish or vegetables using olive oil, lemon juice, garlic, and fresh herbs.  Meal planning   · Plan to eat 1 vegetarian meal one day each week. Try to work up to 2 vegetarian meals, if possible.  · Eat seafood 2 or more times a week.  · Have healthy snacks readily available, such as:  ¨ Vegetable sticks with hummus.  ¨ Greek yogurt.  ¨ Fruit and nut trail mix.  · Eat balanced meals throughout the week. This includes:  ¨ Fruit: 2-3 servings a day  ¨ Vegetables: 4-5 servings a day  ¨ Low-fat dairy: 2 servings a day  ¨ Fish, poultry, or lean meat: 1 serving a day  ¨ Beans and legumes: 2 or more servings a week  ¨ Nuts and seeds: 1-2 servings a day  ¨ Whole grains: 6-8 servings a day  ¨ Extra-virgin olive oil: 3-4 servings a day  · Limit red meat and sweets to only a few servings a month  What are my food choices?  · Mediterranean diet  ¨ Recommended  ¨ Grains: Whole-grain pasta. Brown rice. Bulgar wheat. Polenta. Couscous. Whole-wheat bread. Oatmeal. Quinoa.  ¨ Vegetables: Artichokes. Beets. Broccoli. Cabbage. Carrots. Eggplant. Green beans. Chard. Kale. Spinach. Onions. Leeks. Peas. Squash.  Tomatoes. Peppers. Radishes.  ¨ Fruits: Apples. Apricots. Avocado. Berries. Bananas. Cherries. Dates. Figs. Grapes. Tiffanie. Melon. Oranges. Peaches. Plums. Pomegranate.  ¨ Meats and other protein foods: Beans. Almonds. Sunflower seeds. Pine nuts. Peanuts. Cod. [place]. Scallops. Shrimp. Tuna. Tilapia. Clams. Oysters. Eggs.  ¨ Dairy: Low-fat milk. Cheese. Greek yogurt.  ¨ Beverages: Water. Red wine. Herbal tea.  ¨ Fats and oils: Extra virgin olive oil. Avocado oil. Grape seed oil.  ¨ Sweets and desserts: Greek yogurt with honey. Baked apples. Poached pears. Trail mix.  ¨ Seasoning and other foods: Basil. Cilantro. Coriander. Cumin. Mint. Parsley. Bin. Rosemary. Tarragon. Garlic. Oregano. Thyme. Pepper. Balsalmic vinegar. Tahini. Hummus. Tomato sauce. Olives. Mushrooms.  ¨ Limit these  ¨ Grains: Prepackaged pasta or rice dishes. Prepackaged cereal with added sugar.  ¨ Vegetables: Deep fried potatoes (french fries).  ¨ Fruits: Fruit canned in syrup.  ¨ Meats and other protein foods: Beef. Pork. Lamb. Poultry with skin. Hot dogs. [place].  ¨ Dairy: Ice cream. Sour cream. Whole milk.  ¨ Beverages: Juice. Sugar-sweetened soft drinks. Beer. Liquor and spirits.  ¨ Fats and oils: Butter. Canola oil. Vegetable oil. Beef fat (tallow). Lard.  ¨ Sweets and desserts: Cookies. Cakes. Pies. Candy.  ¨ Seasoning and other foods: Mayonnaise. Premade sauces and marinades.  ¨ The items listed may not be a complete list. Talk with your dietitian about what dietary choices are right for you.  Summary  · The Mediterranean diet includes both food and lifestyle choices.  · Eat a variety of fresh fruits and vegetables, beans, nuts, seeds, and whole grains.  · Limit the amount of red meat and sweets that you eat.  · Talk with your health care provider about whether it is safe for you to drink red wine in moderation. This means 1 glass a day for nonpregnant women and 2 glasses a day for men. A glass of wine equals 5 oz (150 mL).  This information  is not intended to replace advice given to you by your health care provider. Make sure you discuss any questions you have with your health care provider.  Document Released: 08/10/2017 Document Revised: 09/12/2017 Document Reviewed: 08/10/2017  Elsevier Interactive Patient Education © 2017 Elsevier Inc.

## 2018-05-04 NOTE — PROGRESS NOTES
Subjective   Ronit Robbins is a 58 y.o. female.     Hyperlipidemia   This is a chronic problem. The current episode started more than 1 year ago. The problem is controlled. There are no known factors aggravating her hyperlipidemia. Pertinent negatives include no chest pain. Current antihyperlipidemic treatment includes diet change and statins. The current treatment provides moderate improvement of lipids. Compliance problems include adherence to diet.  Risk factors for coronary artery disease include dyslipidemia and post-menopausal.        The following portions of the patient's history were reviewed and updated as appropriate: allergies, current medications, past family history, past medical history, past social history, past surgical history and problem list.    Review of Systems   Cardiovascular: Negative for chest pain.   Gastrointestinal:        Epigastric discomfort-gallbladder versus stomach   Musculoskeletal:        Recent fracture right ankle-improving   All other systems reviewed and are negative.      Objective   Physical Exam   Constitutional: She is oriented to person, place, and time. She appears well-developed and well-nourished.   HENT:   Right Ear: External ear normal.   Left Ear: External ear normal.   Mouth/Throat: Oropharynx is clear and moist.   Eyes: EOM are normal. Pupils are equal, round, and reactive to light.   Neck: No thyromegaly present.   Good carotid pulses   Cardiovascular: Normal rate and regular rhythm.    Pulmonary/Chest: Effort normal and breath sounds normal.   Breasts no masses noted   Abdominal: Soft. Bowel sounds are normal. There is tenderness.   Musculoskeletal: She exhibits no edema.   Lymphadenopathy:     She has no cervical adenopathy.   Neurological: She is alert and oriented to person, place, and time.   Skin: Skin is warm and dry. Capillary refill takes less than 2 seconds.   Psychiatric: She has a normal mood and affect. Her behavior is normal. Judgment and thought  content normal.   Nursing note and vitals reviewed.      Assessment/Plan   Problems Addressed this Visit     None      Visit Diagnoses     Encounter for screening mammogram for malignant neoplasm of breast    -  Primary    Relevant Orders    Mammo Screening Bilateral With CAD    Symptomatic menopausal or female climacteric states        Relevant Orders    DEXA Bone Density Axial    Need for prophylactic vaccination with combined diphtheria-tetanus-pertussis (DTP) vaccine        Relevant Orders    Tdap Vaccine Greater Than or Equal To 6yo IM          Plan above plus upper GI and ultrasound-had severe chest and epigastric pain necessitating ER [id number]

## 2018-05-07 DIAGNOSIS — Z12.12 SCREENING FOR MALIGNANT NEOPLASM OF THE RECTUM: ICD-10-CM

## 2018-05-08 LAB — HEMOCCULT STL QL IA: NEGATIVE

## 2018-05-25 DIAGNOSIS — Z12.31 ENCOUNTER FOR SCREENING MAMMOGRAM FOR MALIGNANT NEOPLASM OF BREAST: ICD-10-CM

## 2018-05-25 DIAGNOSIS — R10.13 EPIGASTRIC PAIN: ICD-10-CM

## 2018-05-25 DIAGNOSIS — M25.552 PAIN OF LEFT HIP JOINT: Primary | ICD-10-CM

## 2018-05-25 DIAGNOSIS — N95.1 SYMPTOMATIC MENOPAUSAL OR FEMALE CLIMACTERIC STATES: ICD-10-CM

## 2018-05-25 DIAGNOSIS — R10.9 ABDOMINAL PAIN, UNSPECIFIED ABDOMINAL LOCATION: ICD-10-CM

## 2018-05-30 ENCOUNTER — RESULTS ENCOUNTER (OUTPATIENT)
Dept: FAMILY MEDICINE CLINIC | Facility: CLINIC | Age: 58
End: 2018-05-30

## 2018-05-30 DIAGNOSIS — R10.9 ABDOMINAL PAIN, UNSPECIFIED ABDOMINAL LOCATION: ICD-10-CM

## 2018-06-04 RX ORDER — GABAPENTIN 300 MG/1
300 CAPSULE ORAL NIGHTLY
Qty: 90 CAPSULE | Refills: 1 | Status: SHIPPED | OUTPATIENT
Start: 2018-06-04 | End: 2018-10-01 | Stop reason: SDUPTHER

## 2018-06-07 LAB
ALBUMIN SERPL-MCNC: 4.4 G/DL (ref 3.5–5)
ALBUMIN/GLOB SERPL: 1.4 G/DL (ref 1.1–2.5)
ALP SERPL-CCNC: 71 U/L (ref 24–120)
ALT SERPL-CCNC: 29 U/L (ref 0–54)
AST SERPL-CCNC: 25 U/L (ref 7–45)
BILIRUB SERPL-MCNC: 0.3 MG/DL (ref 0.1–1)
BUN SERPL-MCNC: 22 MG/DL (ref 5–21)
BUN/CREAT SERPL: 35.5 (ref 7–25)
CALCIUM SERPL-MCNC: 10 MG/DL (ref 8.4–10.4)
CHLORIDE SERPL-SCNC: 102 MMOL/L (ref 98–110)
CO2 SERPL-SCNC: 27 MMOL/L (ref 24–31)
CREAT SERPL-MCNC: 0.62 MG/DL (ref 0.5–1.4)
GFR SERPLBLD CREATININE-BSD FMLA CKD-EPI: 119 ML/MIN/1.73
GFR SERPLBLD CREATININE-BSD FMLA CKD-EPI: 99 ML/MIN/1.73
GLOBULIN SER CALC-MCNC: 3.1 GM/DL
GLUCOSE SERPL-MCNC: 92 MG/DL (ref 70–100)
POTASSIUM SERPL-SCNC: 5.2 MMOL/L (ref 3.5–5.3)
PROT SERPL-MCNC: 7.5 G/DL (ref 6.3–8.7)
SODIUM SERPL-SCNC: 142 MMOL/L (ref 135–145)

## 2018-06-08 DIAGNOSIS — K81.9 CHOLECYSTITIS: Primary | ICD-10-CM

## 2018-06-08 DIAGNOSIS — R10.9 ABDOMINAL PAIN, UNSPECIFIED ABDOMINAL LOCATION: ICD-10-CM

## 2018-06-18 RX ORDER — LISINOPRIL 5 MG/1
TABLET ORAL
Qty: 90 TABLET | Refills: 3 | Status: SHIPPED | OUTPATIENT
Start: 2018-06-18 | End: 2019-04-16 | Stop reason: SDUPTHER

## 2018-06-27 RX ORDER — ESTRADIOL 10 UG/1
TABLET VAGINAL
Qty: 45 TABLET | Refills: 3 | Status: SHIPPED | OUTPATIENT
Start: 2018-06-27 | End: 2019-07-12

## 2018-06-27 RX ORDER — OMEPRAZOLE 20 MG/1
CAPSULE, DELAYED RELEASE ORAL
Qty: 90 CAPSULE | Refills: 3 | Status: SHIPPED | OUTPATIENT
Start: 2018-06-27 | End: 2019-05-10 | Stop reason: SDUPTHER

## 2018-07-02 RX ORDER — PRAVASTATIN SODIUM 20 MG
20 TABLET ORAL NIGHTLY
Qty: 90 TABLET | Refills: 0 | Status: SHIPPED | OUTPATIENT
Start: 2018-07-02 | End: 2018-09-30 | Stop reason: SDUPTHER

## 2018-07-17 RX ORDER — MONTELUKAST SODIUM 4 MG/1
1 TABLET, CHEWABLE ORAL 2 TIMES DAILY
Qty: 60 TABLET | Refills: 5 | Status: SHIPPED | OUTPATIENT
Start: 2018-07-17 | End: 2019-05-10

## 2018-10-01 ENCOUNTER — TELEPHONE (OUTPATIENT)
Dept: FAMILY MEDICINE CLINIC | Facility: CLINIC | Age: 58
End: 2018-10-01

## 2018-10-01 RX ORDER — PRAVASTATIN SODIUM 20 MG
TABLET ORAL
Qty: 90 TABLET | Refills: 2 | Status: SHIPPED | OUTPATIENT
Start: 2018-10-01 | End: 2019-05-10 | Stop reason: SDUPTHER

## 2018-10-01 RX ORDER — GABAPENTIN 300 MG/1
300 CAPSULE ORAL NIGHTLY
Qty: 90 CAPSULE | Refills: 1 | Status: SHIPPED | OUTPATIENT
Start: 2018-10-01 | End: 2019-03-11 | Stop reason: SDUPTHER

## 2018-10-01 NOTE — TELEPHONE ENCOUNTER
Burning on outside still - worse at night, hypersensitivity is better, helping her sleep at night.  Does she need to continue gabapentin?  Patient states was not sure how long you wanted her to take medication        has no choice-increase gabapentin at bedtime to help accommodate discomfort

## 2018-10-16 RX ORDER — METHOCARBAMOL 500 MG/1
500 TABLET, FILM COATED ORAL 3 TIMES DAILY
Qty: 270 TABLET | Refills: 2 | Status: SHIPPED | OUTPATIENT
Start: 2018-10-16 | End: 2018-10-16

## 2018-10-16 RX ORDER — CYCLOBENZAPRINE HCL 10 MG
10 TABLET ORAL 3 TIMES DAILY PRN
Qty: 90 TABLET | Refills: 0 | Status: SHIPPED | OUTPATIENT
Start: 2018-10-16 | End: 2019-01-10 | Stop reason: SDUPTHER

## 2018-10-16 NOTE — TELEPHONE ENCOUNTER
MED REFILL REQUEST      CURRENTLY TAKING ROBAXIN 500MG (1) TID # 270-MEDICATION VIA MAIL ORDER IS NOT AVAILABLE.      DOES PT NEED TO CONTINUE ROBAXIN WITH LOCAL PHARMACY OR IS THERE A SUBSTITUTION?    [facility]-IF NEW MEDICATION FOR 90 DAY.    [facility], [place]

## 2018-10-16 NOTE — TELEPHONE ENCOUNTER
[facility] FAX STATES DRUG NOT AVAILABLE.  MED CHANGE TO CHLORZOXAZONE 250MG-MED NOT COVERED PER INSURANCE.     PER MD OK TO CHANGE TO FLEXERIL 10MG (1) TID #90    [facility]

## 2018-11-09 ENCOUNTER — OFFICE VISIT (OUTPATIENT)
Dept: FAMILY MEDICINE CLINIC | Facility: CLINIC | Age: 58
End: 2018-11-09

## 2018-11-09 VITALS
DIASTOLIC BLOOD PRESSURE: 82 MMHG | HEART RATE: 76 BPM | SYSTOLIC BLOOD PRESSURE: 133 MMHG | TEMPERATURE: 97.8 F | HEIGHT: 63 IN | WEIGHT: 162 LBS | OXYGEN SATURATION: 98 % | BODY MASS INDEX: 28.7 KG/M2

## 2018-11-09 DIAGNOSIS — E78.2 MIXED HYPERLIPIDEMIA: Primary | ICD-10-CM

## 2018-11-09 DIAGNOSIS — R73.9 HYPERGLYCEMIA: ICD-10-CM

## 2018-11-09 DIAGNOSIS — Z23 NEED FOR IMMUNIZATION AGAINST INFLUENZA: ICD-10-CM

## 2018-11-09 LAB
ALBUMIN SERPL-MCNC: 4.6 G/DL (ref 3.5–5)
ALBUMIN/GLOB SERPL: 1.5 G/DL (ref 1.1–2.5)
ALP SERPL-CCNC: 80 U/L (ref 24–120)
ALT SERPL-CCNC: 30 U/L (ref 0–54)
AST SERPL-CCNC: 28 U/L (ref 7–45)
BILIRUB SERPL-MCNC: 0.4 MG/DL (ref 0.1–1)
BUN SERPL-MCNC: 16 MG/DL (ref 5–21)
BUN/CREAT SERPL: 26.7 (ref 7–25)
CALCIUM SERPL-MCNC: 10.1 MG/DL (ref 8.4–10.4)
CHLORIDE SERPL-SCNC: 98 MMOL/L (ref 98–110)
CHOLEST SERPL-MCNC: 183 MG/DL (ref 130–200)
CO2 SERPL-SCNC: 27 MMOL/L (ref 24–31)
CREAT SERPL-MCNC: 0.6 MG/DL (ref 0.5–1.4)
GLOBULIN SER CALC-MCNC: 3.1 GM/DL
GLUCOSE SERPL-MCNC: 94 MG/DL (ref 70–100)
HBA1C MFR BLD: 5.6 %
HDLC SERPL-MCNC: 69 MG/DL
LDLC SERPL CALC-MCNC: 99 MG/DL (ref 0–99)
POTASSIUM SERPL-SCNC: 4.8 MMOL/L (ref 3.5–5.3)
PROT SERPL-MCNC: 7.7 G/DL (ref 6.3–8.7)
SODIUM SERPL-SCNC: 139 MMOL/L (ref 135–145)
TRIGL SERPL-MCNC: 76 MG/DL (ref 0–149)
VLDLC SERPL CALC-MCNC: 15.2 MG/DL

## 2018-11-09 PROCEDURE — 99213 OFFICE O/P EST LOW 20 MIN: CPT | Performed by: FAMILY MEDICINE

## 2018-11-09 RX ORDER — ONDANSETRON 4 MG/1
4 TABLET, FILM COATED ORAL EVERY 8 HOURS PRN
Qty: 10 TABLET | Refills: 1 | Status: SHIPPED | OUTPATIENT
Start: 2018-11-09 | End: 2020-08-07 | Stop reason: SDUPTHER

## 2018-11-09 NOTE — PROGRESS NOTES
Subjective   Ronit Robbins is a 58 y.o. female.     Hyperlipidemia   This is a chronic problem. The current episode started more than 1 year ago. The problem is controlled. Recent lipid tests were reviewed and are variable. There are no known factors aggravating her hyperlipidemia. Pertinent negatives include no chest pain. Current antihyperlipidemic treatment includes diet change and statins. The current treatment provides moderate improvement of lipids. Compliance problems include adherence to diet.  Risk factors for coronary artery disease include dyslipidemia, post-menopausal and hypertension.       The following portions of the patient's history were reviewed and updated as appropriate: allergies, current medications, past family history, past medical history, past social history, past surgical history and problem list.    Review of Systems   Cardiovascular: Negative for chest pain.   Gastrointestinal:        Post cholecystectomy diarrhea-stop metformin   Neurological:        Post traumatic neuropathy right leg continues       Objective   Physical Exam   Constitutional: She is oriented to person, place, and time.   Overweight   Cardiovascular: Normal rate and regular rhythm.    Pulmonary/Chest: Effort normal and breath sounds normal.   Musculoskeletal: She exhibits no edema.   Neurological: She is alert and oriented to person, place, and time.   Psychiatric: She has a normal mood and affect. Her behavior is normal. Judgment and thought content normal.   Nursing note and vitals reviewed.      Assessment/Plan   Ronit was seen today for follow-up.    Diagnoses and all orders for this visit:    Mixed hyperlipidemia  -     Comprehensive Metabolic Panel  -     Lipid Panel    Need for immunization against influenza    Hyperglycemia  -     Hemoglobin A1c    Other orders  -     Cancel: Flucelvax Quad=>4Years (PFS)     Plan-stop metformin-if diarrhea does not clear up the next month will need to be rescoped

## 2019-01-10 RX ORDER — CYCLOBENZAPRINE HCL 10 MG
10 TABLET ORAL 3 TIMES DAILY PRN
Qty: 90 TABLET | Refills: 1 | Status: SHIPPED | OUTPATIENT
Start: 2019-01-10 | End: 2019-07-12 | Stop reason: SDUPTHER

## 2019-03-11 RX ORDER — GABAPENTIN 300 MG/1
300 CAPSULE ORAL NIGHTLY
Qty: 90 CAPSULE | Refills: 1 | Status: SHIPPED | OUTPATIENT
Start: 2019-03-11 | End: 2019-09-10 | Stop reason: SDUPTHER

## 2019-03-11 RX ORDER — MECLIZINE HCL 25MG 25 MG/1
25 TABLET, CHEWABLE ORAL 3 TIMES DAILY PRN
Qty: 90 TABLET | Refills: 3 | Status: SHIPPED | OUTPATIENT
Start: 2019-03-11 | End: 2019-03-14

## 2019-03-14 RX ORDER — MECLIZINE HYDROCHLORIDE 25 MG/1
25 TABLET ORAL 3 TIMES DAILY PRN
Qty: 90 TABLET | Refills: 1 | Status: SHIPPED | OUTPATIENT
Start: 2019-03-14 | End: 2019-11-25 | Stop reason: SDUPTHER

## 2019-03-20 RX ORDER — AMOXICILLIN 500 MG/1
500 CAPSULE ORAL 3 TIMES DAILY
Qty: 21 CAPSULE | Refills: 0 | Status: SHIPPED | OUTPATIENT
Start: 2019-03-20 | End: 2019-05-10

## 2019-03-20 NOTE — TELEPHONE ENCOUNTER
SYMPTOMS STARTED 03.11.19--HEAD/CHEST CLARA, FEVER/CHILLS AT TIMES, PROD COUGH-COLORED SPUTUM. USING DAYQUIL/NYQUIL & SALINE MIST.      CAN WE SEND IN ANTIBIOTIC?    [facility]

## 2019-04-15 DIAGNOSIS — R73.9 HYPERGLYCEMIA: Primary | ICD-10-CM

## 2019-04-16 RX ORDER — LISINOPRIL 5 MG/1
5 TABLET ORAL DAILY
Qty: 90 TABLET | Refills: 3 | Status: SHIPPED | OUTPATIENT
Start: 2019-04-16 | End: 2020-04-15 | Stop reason: SDUPTHER

## 2019-04-19 RX ORDER — SULFAMETHOXAZOLE AND TRIMETHOPRIM 800; 160 MG/1; MG/1
1 TABLET ORAL 2 TIMES DAILY
Qty: 14 TABLET | Refills: 0 | Status: SHIPPED | OUTPATIENT
Start: 2019-04-19 | End: 2019-05-10

## 2019-04-19 RX ORDER — NITROFURANTOIN MACROCRYSTALS 50 MG/1
50 CAPSULE ORAL NIGHTLY
Qty: 21 CAPSULE | Refills: 0 | Status: SHIPPED | OUTPATIENT
Start: 2019-04-26 | End: 2020-02-06 | Stop reason: ALTCHOICE

## 2019-05-10 ENCOUNTER — OFFICE VISIT (OUTPATIENT)
Dept: FAMILY MEDICINE CLINIC | Facility: CLINIC | Age: 59
End: 2019-05-10

## 2019-05-10 VITALS
TEMPERATURE: 98.2 F | HEIGHT: 63 IN | DIASTOLIC BLOOD PRESSURE: 78 MMHG | BODY MASS INDEX: 27.85 KG/M2 | SYSTOLIC BLOOD PRESSURE: 128 MMHG | WEIGHT: 157.2 LBS | OXYGEN SATURATION: 98 % | HEART RATE: 91 BPM

## 2019-05-10 DIAGNOSIS — I10 HYPERTENSION, UNSPECIFIED TYPE: Primary | ICD-10-CM

## 2019-05-10 DIAGNOSIS — Z12.39 SCREENING FOR MALIGNANT NEOPLASM OF BREAST: ICD-10-CM

## 2019-05-10 DIAGNOSIS — Z00.00 ANNUAL PHYSICAL EXAM: ICD-10-CM

## 2019-05-10 DIAGNOSIS — Z12.12 SCREENING FOR COLORECTAL CANCER: ICD-10-CM

## 2019-05-10 DIAGNOSIS — Z12.11 SCREENING FOR COLORECTAL CANCER: ICD-10-CM

## 2019-05-10 LAB
BILIRUB BLD-MCNC: NEGATIVE MG/DL
CLARITY, POC: CLEAR
COLOR UR: YELLOW
GLUCOSE UR STRIP-MCNC: NEGATIVE MG/DL
KETONES UR QL: NEGATIVE
LEUKOCYTE EST, POC: ABNORMAL
NITRITE UR-MCNC: NEGATIVE MG/ML
PH UR: 5.5 [PH] (ref 5–8)
PROT UR STRIP-MCNC: NEGATIVE MG/DL
RBC # UR STRIP: NEGATIVE /UL
SP GR UR: 1.01 (ref 1–1.03)
UROBILINOGEN UR QL: NORMAL

## 2019-05-10 PROCEDURE — 99396 PREV VISIT EST AGE 40-64: CPT | Performed by: FAMILY MEDICINE

## 2019-05-10 PROCEDURE — 81003 URINALYSIS AUTO W/O SCOPE: CPT | Performed by: FAMILY MEDICINE

## 2019-05-10 RX ORDER — OMEPRAZOLE 20 MG/1
20 CAPSULE, DELAYED RELEASE ORAL DAILY
Qty: 90 CAPSULE | Refills: 3 | Status: SHIPPED | OUTPATIENT
Start: 2019-05-10 | End: 2020-05-08 | Stop reason: SDUPTHER

## 2019-05-10 RX ORDER — PRAVASTATIN SODIUM 20 MG
20 TABLET ORAL NIGHTLY
Qty: 90 TABLET | Refills: 3 | Status: SHIPPED | OUTPATIENT
Start: 2019-05-10 | End: 2020-05-15 | Stop reason: SDUPTHER

## 2019-05-10 NOTE — PROGRESS NOTES
Subjective   Ronit Robbins is a 59 y.o. female.     59-year-old female with history of hypertension hyperlipidemia      Hypertension   This is a chronic problem. The current episode started more than 1 year ago. The problem has been resolved since onset. The problem is controlled. Pertinent negatives include no chest pain. There are no associated agents to hypertension. Risk factors for coronary artery disease include dyslipidemia and family history. Past treatments include ACE inhibitors. Current antihypertension treatment includes ACE inhibitors. The current treatment provides moderate improvement. There are no compliance problems.         The following portions of the patient's history were reviewed and updated as appropriate: allergies, current medications, past family history, past medical history, past social history, past surgical history and problem list.    Review of Systems   Respiratory: Negative for apnea.    Cardiovascular: Negative for chest pain and leg swelling.   Musculoskeletal: Positive for arthralgias and back pain.   All other systems reviewed and are negative.      Objective   Physical Exam   Constitutional: She is oriented to person, place, and time.   Slightly overweight   HENT:   Right Ear: External ear normal.   Left Ear: External ear normal.   Eyes: EOM are normal. Pupils are equal, round, and reactive to light.   Neck: No thyromegaly present.   Good carotid pulses   Cardiovascular: Normal rate and regular rhythm.   Pulmonary/Chest: Effort normal and breath sounds normal.   Breasts no masses noted   Abdominal: Soft. Bowel sounds are normal. She exhibits no mass.   Genitourinary:   Genitourinary Comments: Surgical past   Lymphadenopathy:     She has no cervical adenopathy.   Neurological: She is alert and oriented to person, place, and time.   Skin: Skin is warm and dry. Capillary refill takes less than 2 seconds.   Psychiatric: She has a normal mood and affect. Her behavior is normal.  Judgment and thought content normal.   Nursing note and vitals reviewed.      Assessment/Plan   Problems Addressed this Visit     None      Visit Diagnoses     Hypertension, unspecified type    -  Primary    Relevant Orders    POC Urinalysis Dipstick, Multipro (Completed)    Screening for colorectal cancer        Relevant Orders    Cologuard - Stool, Per Rectum    Screening for malignant neoplasm of breast        Relevant Orders    Mammo Screening Bilateral With CAD    Annual physical exam              Continue above-good compliance with exercise etc.

## 2019-07-12 RX ORDER — CYCLOBENZAPRINE HCL 10 MG
10 TABLET ORAL 3 TIMES DAILY PRN
Qty: 90 TABLET | Refills: 1 | Status: SHIPPED | OUTPATIENT
Start: 2019-07-12 | End: 2020-01-09 | Stop reason: SDUPTHER

## 2019-07-12 RX ORDER — ESTRADIOL 10 UG/1
1 INSERT VAGINAL 3 TIMES WEEKLY
Qty: 36 TABLET | Refills: 2 | Status: SHIPPED | OUTPATIENT
Start: 2019-07-12 | End: 2022-01-27 | Stop reason: SDUPTHER

## 2019-09-10 ENCOUNTER — CLINICAL SUPPORT (OUTPATIENT)
Dept: FAMILY MEDICINE CLINIC | Facility: CLINIC | Age: 59
End: 2019-09-10

## 2019-09-10 VITALS
HEART RATE: 76 BPM | HEIGHT: 63 IN | SYSTOLIC BLOOD PRESSURE: 133 MMHG | BODY MASS INDEX: 27.46 KG/M2 | WEIGHT: 155 LBS | OXYGEN SATURATION: 99 % | DIASTOLIC BLOOD PRESSURE: 84 MMHG | TEMPERATURE: 98.4 F

## 2019-09-10 DIAGNOSIS — Z51.81 THERAPEUTIC DRUG MONITORING: ICD-10-CM

## 2019-09-10 RX ORDER — GABAPENTIN 300 MG/1
300 CAPSULE ORAL NIGHTLY
Qty: 90 CAPSULE | Refills: 1 | Status: SHIPPED | OUTPATIENT
Start: 2019-09-10 | End: 2020-03-02 | Stop reason: SDUPTHER

## 2019-09-12 LAB — GABAPENTIN UR-MCNC: 72.5 UG/ML

## 2019-11-25 RX ORDER — MECLIZINE HYDROCHLORIDE 25 MG/1
25 TABLET ORAL 3 TIMES DAILY PRN
Qty: 90 TABLET | Refills: 1 | Status: SHIPPED | OUTPATIENT
Start: 2019-11-25 | End: 2020-08-07 | Stop reason: SDUPTHER

## 2019-12-30 DIAGNOSIS — R73.9 HYPERGLYCEMIA: ICD-10-CM

## 2020-01-09 RX ORDER — CYCLOBENZAPRINE HCL 10 MG
10 TABLET ORAL 3 TIMES DAILY PRN
Qty: 90 TABLET | Refills: 1 | Status: SHIPPED | OUTPATIENT
Start: 2020-01-09 | End: 2020-07-08 | Stop reason: SDUPTHER

## 2020-02-06 ENCOUNTER — OFFICE VISIT (OUTPATIENT)
Dept: FAMILY MEDICINE CLINIC | Facility: CLINIC | Age: 60
End: 2020-02-06

## 2020-02-06 VITALS
SYSTOLIC BLOOD PRESSURE: 132 MMHG | TEMPERATURE: 98.4 F | BODY MASS INDEX: 26.86 KG/M2 | OXYGEN SATURATION: 99 % | WEIGHT: 151.6 LBS | HEART RATE: 88 BPM | DIASTOLIC BLOOD PRESSURE: 83 MMHG

## 2020-02-06 DIAGNOSIS — S99.929A INJURY OF NAIL BED OF TOE: Primary | ICD-10-CM

## 2020-02-06 PROCEDURE — 99213 OFFICE O/P EST LOW 20 MIN: CPT | Performed by: NURSE PRACTITIONER

## 2020-02-06 NOTE — PROGRESS NOTES
CC: toenail problem    History:  Ronit Robbins is a 59 y.o. female who presents today for evaluation of the above problems.   Patient concerned that she has nail fungus in her great toes.  When taking polish off last night right great toenail split about half way down the nail.  The white of the nail is larger than normal on both great toes.      HPI  ROS:  Review of Systems   Skin:        White of great toes is misshapen on both feet.  Right nail is split.        No Known Allergies  Past Medical History:   Diagnosis Date   • Hyperlipidemia    • Hypertension    • Scoliosis      Past Surgical History:   Procedure Laterality Date   • HAND SURGERY     • HYSTERECTOMY     • SPINAL FUSION     • VEIN SURGERY       Family History   Problem Relation Age of Onset   • Breast cancer Other    • No Known Problems Mother    • No Known Problems Father    • No Known Problems Sister    • Dementia Brother    • No Known Problems Son    • Heart disease Maternal Grandmother    • No Known Problems Maternal Grandfather    • Cancer Paternal Grandmother    • Cancer Paternal Grandfather       reports that she has never smoked. She has never used smokeless tobacco. She reports that she drinks alcohol. She reports that she does not use drugs.      Current Outpatient Medications:   •  cholecalciferol (VITAMIN D3) 1000 UNITS tablet, Take 1,000 Units by mouth Daily., Disp: , Rfl:   •  cyclobenzaprine (FLEXERIL) 10 MG tablet, Take 1 tablet by mouth 3 (Three) Times a Day As Needed for Muscle Spasms., Disp: 90 tablet, Rfl: 1  •  estradiol (VAGIFEM) 10 MCG tablet vaginal tablet, Insert 1 tablet into the vagina 3 (Three) Times a Week., Disp: 36 tablet, Rfl: 2  •  gabapentin (NEURONTIN) 300 MG capsule, Take 1 capsule by mouth Every Night., Disp: 90 capsule, Rfl: 1  •  lisinopril (PRINIVIL,ZESTRIL) 5 MG tablet, Take 1 tablet by mouth Daily., Disp: 90 tablet, Rfl: 3  •  meclizine (ANTIVERT) 25 MG tablet, Take 1 tablet by mouth 3 (Three) Times a Day As  Needed for Dizziness., Disp: 90 tablet, Rfl: 1  •  metFORMIN (GLUCOPHAGE) 500 MG tablet, Take 1 tablet by mouth 2 (Two) Times a Day With Meals., Disp: 180 tablet, Rfl: 0  •  omeprazole (priLOSEC) 20 MG capsule, Take 1 capsule by mouth Daily., Disp: 90 capsule, Rfl: 3  •  pravastatin (PRAVACHOL) 20 MG tablet, Take 1 tablet by mouth Every Night., Disp: 90 tablet, Rfl: 3  •  ondansetron (ZOFRAN) 4 MG tablet, Take 1 tablet by mouth Every 8 (Eight) Hours As Needed for Nausea or Vomiting., Disp: 10 tablet, Rfl: 1    OBJECTIVE:  /83 (BP Location: Left arm, Patient Position: Sitting, Cuff Size: Adult)   Pulse 88   Temp 98.4 °F (36.9 °C) (Oral)   Wt 68.8 kg (151 lb 9.6 oz)   LMP  (LMP Unknown)   SpO2 99%   Breastfeeding No   BMI 26.86 kg/m²    Physical Exam   Constitutional: She is oriented to person, place, and time. Vital signs are normal. She appears well-developed and well-nourished.   Cardiovascular: Normal rate.   Pulmonary/Chest: Effort normal.        Neurological: She is alert and oriented to person, place, and time.   Psychiatric: She has a normal mood and affect. Her behavior is normal.   Vitals reviewed.      Assessment/Plan    Ronit was seen today for nail problem.    Diagnoses and all orders for this visit:    Injury of nail bed of toe  -     Ambulatory Referral to Podiatry    Advised that this appears to be trauma from shoes causing separation from nail bed, not fungal.  However, since patient has concern for fungus will set up to see podiatry.  Advised that she can use OTC fungal treatment in the mean time and see if this helps.  Patient felt that nails were thickened, but I did not feel that they appeared thick.     An After Visit Summary was printed and given to the patient at discharge.  No follow-ups on file.       EDGARD Hennessy 02/06/20    Electronically signed.

## 2020-03-02 ENCOUNTER — CLINICAL SUPPORT (OUTPATIENT)
Dept: FAMILY MEDICINE CLINIC | Facility: CLINIC | Age: 60
End: 2020-03-02

## 2020-03-02 VITALS
SYSTOLIC BLOOD PRESSURE: 125 MMHG | WEIGHT: 151.8 LBS | OXYGEN SATURATION: 99 % | HEART RATE: 82 BPM | DIASTOLIC BLOOD PRESSURE: 65 MMHG | TEMPERATURE: 98.2 F | BODY MASS INDEX: 26.9 KG/M2

## 2020-03-02 DIAGNOSIS — Z51.81 THERAPEUTIC DRUG MONITORING: Primary | ICD-10-CM

## 2020-03-02 DIAGNOSIS — G62.9 NEUROPATHY: ICD-10-CM

## 2020-03-02 PROCEDURE — 99211 OFF/OP EST MAY X REQ PHY/QHP: CPT | Performed by: FAMILY MEDICINE

## 2020-03-02 RX ORDER — GABAPENTIN 300 MG/1
300 CAPSULE ORAL NIGHTLY
Qty: 90 CAPSULE | Refills: 1 | Status: SHIPPED | OUTPATIENT
Start: 2020-03-02 | End: 2020-09-02 | Stop reason: SDUPTHER

## 2020-03-02 NOTE — PROGRESS NOTES
CONTROLLED SUBSTANCE TRACKING 9/10/2019 3/2/2020   Last Raj 9/10/2019 3/2/2020   Report Number [id number]   Last UDS 9/10/2019 9/10/2019   Last Controlled Substance Agreement 9/10/2019 9/10/2019         Patient presents for refill for gabapentin.  Contract and UDS up to date.  Raj reviewed.

## 2020-04-01 ENCOUNTER — TELEPHONE (OUTPATIENT)
Dept: FAMILY MEDICINE CLINIC | Facility: CLINIC | Age: 60
End: 2020-04-01

## 2020-04-01 DIAGNOSIS — L71.9 ROSACEA: Primary | ICD-10-CM

## 2020-04-01 RX ORDER — DOXYCYCLINE 100 MG/1
TABLET ORAL
Qty: 60 TABLET | Refills: 0 | Status: SHIPPED | OUTPATIENT
Start: 2020-04-01 | End: 2020-09-08

## 2020-04-01 NOTE — TELEPHONE ENCOUNTER
MED REFILL REQUEST        ORIGINAL RX DERM-OFFICE CLOSED D/T COVID-19    REQUESTING ORACEA OR TETRACYCLINE.    ROSACEA FLARE-UP CHEEKS AND BREAKOUT ON CHIN.  USING TOPICAL DIFFERIN ON CHIN & HYDROCORTISONE OINT ON CHEEKS.  ORAL MEDS ARE WHAT SHE PRESCRIBES FOR EXACERBATION.      [facility]

## 2020-04-01 NOTE — TELEPHONE ENCOUNTER
Med sent to pharmacy per Dr. David's advice.  Take daily for one month and then every other day for two months.  Dr. David said to stop the hydrocortisone because it will thin the skin and make things worse.  He suggested rubbing alcohol twice a day.

## 2020-04-06 ENCOUNTER — OFFICE VISIT (OUTPATIENT)
Age: 60
End: 2020-04-06

## 2020-04-06 VITALS
BODY MASS INDEX: 26.58 KG/M2 | WEIGHT: 150 LBS | HEIGHT: 63 IN | TEMPERATURE: 97.7 F | SYSTOLIC BLOOD PRESSURE: 122 MMHG | DIASTOLIC BLOOD PRESSURE: 78 MMHG | RESPIRATION RATE: 16 BRPM

## 2020-04-06 PROCEDURE — 99999 PR OFFICE/OUTPT VISIT,PROCEDURE ONLY: CPT | Performed by: PHYSICIAN ASSISTANT

## 2020-04-06 RX ORDER — MELATONIN
1000 DAILY
COMMUNITY

## 2020-04-06 RX ORDER — OMEPRAZOLE 20 MG/1
20 CAPSULE, DELAYED RELEASE ORAL
COMMUNITY
Start: 2019-05-10

## 2020-04-06 RX ORDER — PRAVASTATIN SODIUM 20 MG
20 TABLET ORAL
COMMUNITY
Start: 2019-05-10

## 2020-04-06 RX ORDER — METHOCARBAMOL 500 MG/1
500 TABLET, FILM COATED ORAL
COMMUNITY

## 2020-04-06 RX ORDER — LISINOPRIL 5 MG/1
5 TABLET ORAL
COMMUNITY
Start: 2019-04-16

## 2020-04-06 RX ORDER — ESTRADIOL 10 UG/1
10 INSERT VAGINAL
COMMUNITY
Start: 2019-07-12

## 2020-04-06 RX ORDER — GABAPENTIN 300 MG/1
300 CAPSULE ORAL
COMMUNITY
Start: 2020-03-02

## 2020-04-06 RX ORDER — DOXYCYCLINE 100 MG/1
100 CAPSULE ORAL
COMMUNITY
Start: 2020-04-01

## 2020-04-06 RX ORDER — MECLIZINE HYDROCHLORIDE 25 MG/1
25 TABLET ORAL
COMMUNITY
Start: 2019-11-25

## 2020-04-06 RX ORDER — ONDANSETRON 4 MG/1
4 TABLET, FILM COATED ORAL EVERY 8 HOURS PRN
COMMUNITY
Start: 2018-11-09

## 2020-04-08 LAB
REPORT: NORMAL
SARS-COV-2: NOT DETECTED
THIS TEST SENT TO: NORMAL

## 2020-04-09 ENCOUNTER — TELEPHONE (OUTPATIENT)
Age: 60
End: 2020-04-09

## 2020-04-09 NOTE — TELEPHONE ENCOUNTER
----- Message from April NISA Felipe sent at 4/8/2020  7:40 PM CDT -----  Call pt and tell covid negative      Contacted pt and informed of results above. Pt verbalized understanding of all.  PP, LPN

## 2020-04-15 RX ORDER — LISINOPRIL 5 MG/1
5 TABLET ORAL DAILY
Qty: 90 TABLET | Refills: 3 | Status: SHIPPED | OUTPATIENT
Start: 2020-04-15 | End: 2020-09-15 | Stop reason: DRUGHIGH

## 2020-05-08 RX ORDER — OMEPRAZOLE 20 MG/1
20 CAPSULE, DELAYED RELEASE ORAL DAILY
Qty: 90 CAPSULE | Refills: 3 | Status: SHIPPED | OUTPATIENT
Start: 2020-05-08 | End: 2021-02-26 | Stop reason: SDUPTHER

## 2020-05-11 ENCOUNTER — TELEPHONE (OUTPATIENT)
Dept: PODIATRY | Facility: CLINIC | Age: 60
End: 2020-05-11

## 2020-05-15 RX ORDER — PRAVASTATIN SODIUM 20 MG
20 TABLET ORAL NIGHTLY
Qty: 90 TABLET | Refills: 0 | Status: SHIPPED | OUTPATIENT
Start: 2020-05-15 | End: 2020-08-24 | Stop reason: SDUPTHER

## 2020-06-23 ENCOUNTER — LAB (OUTPATIENT)
Dept: FAMILY MEDICINE CLINIC | Facility: CLINIC | Age: 60
End: 2020-06-23

## 2020-06-23 DIAGNOSIS — Z00.00 ANNUAL PHYSICAL EXAM: Primary | ICD-10-CM

## 2020-06-23 DIAGNOSIS — R73.9 HYPERGLYCEMIA: ICD-10-CM

## 2020-06-24 LAB
ALBUMIN SERPL-MCNC: 4.6 G/DL (ref 3.5–5.2)
ALBUMIN/GLOB SERPL: 1.7 G/DL
ALP SERPL-CCNC: 66 U/L (ref 39–117)
ALT SERPL-CCNC: 15 U/L (ref 1–33)
AST SERPL-CCNC: 17 U/L (ref 1–32)
BASOPHILS # BLD AUTO: 0.03 10*3/MM3 (ref 0–0.2)
BASOPHILS NFR BLD AUTO: 0.5 % (ref 0–1.5)
BILIRUB SERPL-MCNC: 0.2 MG/DL (ref 0.2–1.2)
BUN SERPL-MCNC: 30 MG/DL (ref 8–23)
BUN/CREAT SERPL: 38 (ref 7–25)
CALCIUM SERPL-MCNC: 9.5 MG/DL (ref 8.6–10.5)
CHLORIDE SERPL-SCNC: 101 MMOL/L (ref 98–107)
CHOLEST SERPL-MCNC: 176 MG/DL (ref 0–200)
CO2 SERPL-SCNC: 25 MMOL/L (ref 22–29)
CREAT SERPL-MCNC: 0.79 MG/DL (ref 0.57–1)
EOSINOPHIL # BLD AUTO: 0.11 10*3/MM3 (ref 0–0.4)
EOSINOPHIL NFR BLD AUTO: 1.8 % (ref 0.3–6.2)
ERYTHROCYTE [DISTWIDTH] IN BLOOD BY AUTOMATED COUNT: 13.2 % (ref 12.3–15.4)
GLOBULIN SER CALC-MCNC: 2.7 GM/DL
GLUCOSE SERPL-MCNC: 94 MG/DL (ref 65–99)
HBA1C MFR BLD: 5.5 % (ref 4.8–5.6)
HCT VFR BLD AUTO: 43.2 % (ref 34–46.6)
HDLC SERPL-MCNC: 74 MG/DL (ref 40–60)
HGB BLD-MCNC: 14.2 G/DL (ref 12–15.9)
IMM GRANULOCYTES # BLD AUTO: 0.01 10*3/MM3 (ref 0–0.05)
IMM GRANULOCYTES NFR BLD AUTO: 0.2 % (ref 0–0.5)
LDLC SERPL CALC-MCNC: 86 MG/DL (ref 0–100)
LYMPHOCYTES # BLD AUTO: 2.34 10*3/MM3 (ref 0.7–3.1)
LYMPHOCYTES NFR BLD AUTO: 38.3 % (ref 19.6–45.3)
MCH RBC QN AUTO: 29.6 PG (ref 26.6–33)
MCHC RBC AUTO-ENTMCNC: 32.9 G/DL (ref 31.5–35.7)
MCV RBC AUTO: 90 FL (ref 79–97)
MONOCYTES # BLD AUTO: 0.4 10*3/MM3 (ref 0.1–0.9)
MONOCYTES NFR BLD AUTO: 6.5 % (ref 5–12)
NEUTROPHILS # BLD AUTO: 3.22 10*3/MM3 (ref 1.7–7)
NEUTROPHILS NFR BLD AUTO: 52.7 % (ref 42.7–76)
NRBC BLD AUTO-RTO: 0 /100 WBC (ref 0–0.2)
PLATELET # BLD AUTO: 273 10*3/MM3 (ref 140–450)
POTASSIUM SERPL-SCNC: 5.2 MMOL/L (ref 3.5–5.2)
PROT SERPL-MCNC: 7.3 G/DL (ref 6–8.5)
RBC # BLD AUTO: 4.8 10*6/MM3 (ref 3.77–5.28)
SODIUM SERPL-SCNC: 137 MMOL/L (ref 136–145)
T4 FREE SERPL-MCNC: 1.31 NG/DL (ref 0.93–1.7)
TRIGL SERPL-MCNC: 82 MG/DL (ref 0–150)
TSH SERPL DL<=0.005 MIU/L-ACNC: 2.03 UIU/ML (ref 0.27–4.2)
VLDLC SERPL CALC-MCNC: 16.4 MG/DL
WBC # BLD AUTO: 6.11 10*3/MM3 (ref 3.4–10.8)

## 2020-06-26 ENCOUNTER — OFFICE VISIT (OUTPATIENT)
Dept: FAMILY MEDICINE CLINIC | Facility: CLINIC | Age: 60
End: 2020-06-26

## 2020-06-26 VITALS
BODY MASS INDEX: 27.82 KG/M2 | OXYGEN SATURATION: 98 % | WEIGHT: 151.2 LBS | TEMPERATURE: 97.1 F | HEART RATE: 84 BPM | HEIGHT: 62 IN | SYSTOLIC BLOOD PRESSURE: 130 MMHG | DIASTOLIC BLOOD PRESSURE: 82 MMHG | RESPIRATION RATE: 16 BRPM

## 2020-06-26 DIAGNOSIS — Z20.828 VIRAL DISEASE EXPOSURE: ICD-10-CM

## 2020-06-26 DIAGNOSIS — I10 HYPERTENSION, UNSPECIFIED TYPE: Primary | ICD-10-CM

## 2020-06-26 DIAGNOSIS — Z00.01 ANNUAL VISIT FOR GENERAL ADULT MEDICAL EXAMINATION WITH ABNORMAL FINDINGS: ICD-10-CM

## 2020-06-26 LAB
BILIRUB BLD-MCNC: NEGATIVE MG/DL
CLARITY, POC: CLEAR
COLOR UR: YELLOW
GLUCOSE UR STRIP-MCNC: NEGATIVE MG/DL
KETONES UR QL: NEGATIVE
LEUKOCYTE EST, POC: NEGATIVE
NITRITE UR-MCNC: NEGATIVE MG/ML
PH UR: 6.5 [PH] (ref 5–8)
PROT UR STRIP-MCNC: NEGATIVE MG/DL
RBC # UR STRIP: NEGATIVE /UL
SP GR UR: 1.02 (ref 1–1.03)
UROBILINOGEN UR QL: NORMAL

## 2020-06-26 PROCEDURE — 99396 PREV VISIT EST AGE 40-64: CPT | Performed by: FAMILY MEDICINE

## 2020-06-26 PROCEDURE — 81003 URINALYSIS AUTO W/O SCOPE: CPT | Performed by: FAMILY MEDICINE

## 2020-06-26 RX ORDER — CLOBETASOL PROPIONATE 0.5 MG/G
CREAM TOPICAL
COMMUNITY
Start: 2020-04-21 | End: 2022-07-29

## 2020-06-26 NOTE — PROGRESS NOTES
Subjective   Ronit Robbins is a 60 y.o. female.     60-year-old female with history of hypertension    Hypertension   This is a chronic problem. The current episode started more than 1 year ago. The problem has been resolved since onset. The problem is controlled. Pertinent negatives include no chest pain. There are no associated agents to hypertension. Risk factors for coronary artery disease include dyslipidemia, post-menopausal state and family history. Past treatments include ACE inhibitors. Current antihypertension treatment includes ACE inhibitors. The current treatment provides significant improvement. There are no compliance problems.      Vitals:    06/26/20 0946   BP: 130/82   Pulse: 84   Resp: 16   Temp: 97.1 °F (36.2 °C)   SpO2: 98%       The following portions of the patient's history were reviewed and updated as appropriate: allergies, current medications, past family history, past medical history, past social history, past surgical history and problem list.    Review of Systems   Respiratory:         was COVID B+ and was treated's symptomatically for 2 weeks- patient did not become ill but had major exposure via , also physical therapy assistant deals with public-we will do antibodies and possibly repeat swab to be sure not a carrier   Cardiovascular: Negative for chest pain and leg swelling.   Musculoskeletal: Positive for arthralgias and back pain.   All other systems reviewed and are negative.      Objective   Physical Exam   Constitutional: She is oriented to person, place, and time. She appears well-developed and well-nourished.   Slightly overweight   HENT:   Right Ear: External ear normal.   Left Ear: External ear normal.   Eyes: Pupils are equal, round, and reactive to light. EOM are normal.   Neck: No thyromegaly present.   Good carotid pulses   Cardiovascular: Normal rate and regular rhythm.   Pulmonary/Chest: Effort normal and breath sounds normal.   Breast no masses noted    Abdominal: Soft. Bowel sounds are normal.   Genitourinary:   Genitourinary Comments: Surgical past   Musculoskeletal: She exhibits no edema.   Lymphadenopathy:     She has no cervical adenopathy.   Neurological: She is alert and oriented to person, place, and time.   Skin: Skin is warm and dry. Capillary refill takes less than 2 seconds.   Psychiatric: She has a normal mood and affect. Her behavior is normal. Judgment and thought content normal.   Nursing note and vitals reviewed.      Patient's Body mass index is 27.65 kg/m². BMI is above normal parameters. Recommendations include: exercise counseling.    CONTROLLED SUBSTANCE TRACKING 9/10/2019 3/2/2020   Last Raj 9/10/2019 3/2/2020   Report Number [id number]   Last UDS 9/10/2019 9/10/2019   Last Controlled Substance Agreement 9/10/2019 9/10/2019     Assessment/Plan   Patient Active Problem List   Diagnosis   • Idiopathic scoliosis   • Essential hypertension   • Mixed hyperlipidemia   • Postmenopausal atrophic vaginitis     Ronit was seen today for annual exam.    Diagnoses and all orders for this visit:    Hypertension, unspecified type  -     POC Urinalysis Dipstick, Multipro    Annual visit for general adult medical examination with abnormal findings    Viral disease exposure  -     SARS-CoV-2 Antibody, IgA  -     SARS-CoV-2 Antibody, IgG  -     SARS-CoV-2 Antibody, IgM       Return in 6 months (on 12/26/2020).       Plan above COVID V testing plus safety advised early flu shot      Electronically signed by Dmitriy Awad MD 06/26/2020

## 2020-06-27 LAB
SARS-COV-2 IGA SERPL QL IA: NEGATIVE
SARS-COV-2 IGG SERPL QL IA: NEGATIVE
SARS-COV-2 IGM SERPL QL IA: NEGATIVE

## 2020-07-08 DIAGNOSIS — R73.9 HYPERGLYCEMIA: ICD-10-CM

## 2020-07-08 RX ORDER — CYCLOBENZAPRINE HCL 10 MG
10 TABLET ORAL 3 TIMES DAILY PRN
Qty: 90 TABLET | Refills: 1 | Status: SHIPPED | OUTPATIENT
Start: 2020-07-08 | End: 2021-01-05 | Stop reason: SDUPTHER

## 2020-07-08 NOTE — TELEPHONE ENCOUNTER
MED REFILL REQUEST      METFORMIN 500MG (1) BID W/MEALS  CYCLOBENZAPRINE 10MG (1) TID MUSCLE SPASMS    [facility]

## 2020-07-20 DIAGNOSIS — L71.9 ROSACEA: ICD-10-CM

## 2020-07-20 RX ORDER — DOXYCYCLINE 100 MG/1
CAPSULE ORAL
Qty: 60 CAPSULE | Refills: 0 | OUTPATIENT
Start: 2020-07-20

## 2020-08-07 RX ORDER — ONDANSETRON 4 MG/1
4 TABLET, FILM COATED ORAL EVERY 8 HOURS PRN
Qty: 10 TABLET | Refills: 1 | Status: SHIPPED | OUTPATIENT
Start: 2020-08-07 | End: 2021-08-30 | Stop reason: SDUPTHER

## 2020-08-07 RX ORDER — MECLIZINE HYDROCHLORIDE 25 MG/1
25 TABLET ORAL 3 TIMES DAILY PRN
Qty: 90 TABLET | Refills: 1 | Status: SHIPPED | OUTPATIENT
Start: 2020-08-07 | End: 2021-03-29 | Stop reason: SDUPTHER

## 2020-08-24 RX ORDER — PRAVASTATIN SODIUM 20 MG
20 TABLET ORAL NIGHTLY
Qty: 90 TABLET | Refills: 3 | Status: SHIPPED | OUTPATIENT
Start: 2020-08-24 | End: 2021-02-26 | Stop reason: SDUPTHER

## 2020-09-02 ENCOUNTER — OFFICE VISIT (OUTPATIENT)
Dept: FAMILY MEDICINE CLINIC | Facility: CLINIC | Age: 60
End: 2020-09-02

## 2020-09-02 VITALS
WEIGHT: 150.8 LBS | OXYGEN SATURATION: 99 % | HEIGHT: 62 IN | HEART RATE: 74 BPM | TEMPERATURE: 97.4 F | SYSTOLIC BLOOD PRESSURE: 124 MMHG | RESPIRATION RATE: 16 BRPM | DIASTOLIC BLOOD PRESSURE: 88 MMHG | BODY MASS INDEX: 27.75 KG/M2

## 2020-09-02 DIAGNOSIS — R52 PAIN: Primary | ICD-10-CM

## 2020-09-02 DIAGNOSIS — G62.9 NEUROPATHY: ICD-10-CM

## 2020-09-02 DIAGNOSIS — R30.0 DYSURIA: ICD-10-CM

## 2020-09-02 DIAGNOSIS — Z51.81 THERAPEUTIC DRUG MONITORING: ICD-10-CM

## 2020-09-02 LAB
BILIRUB BLD-MCNC: NEGATIVE MG/DL
CLARITY, POC: CLEAR
COLOR UR: YELLOW
GLUCOSE UR STRIP-MCNC: NEGATIVE MG/DL
KETONES UR QL: NEGATIVE
LEUKOCYTE EST, POC: ABNORMAL
NITRITE UR-MCNC: NEGATIVE MG/ML
PH UR: 5.5 [PH] (ref 5–8)
PROT UR STRIP-MCNC: NEGATIVE MG/DL
RBC # UR STRIP: NEGATIVE /UL
SP GR UR: 1.03 (ref 1–1.03)
UROBILINOGEN UR QL: NORMAL

## 2020-09-02 PROCEDURE — 99213 OFFICE O/P EST LOW 20 MIN: CPT | Performed by: FAMILY MEDICINE

## 2020-09-02 PROCEDURE — 81003 URINALYSIS AUTO W/O SCOPE: CPT | Performed by: FAMILY MEDICINE

## 2020-09-02 RX ORDER — CALCIPOTRIENE AND BETAMETHASONE DIPROPIONATE 50; .5 UG/G; MG/G
SUSPENSION TOPICAL
COMMUNITY
Start: 2020-07-20 | End: 2022-07-29

## 2020-09-02 RX ORDER — GABAPENTIN 300 MG/1
300 CAPSULE ORAL NIGHTLY
Qty: 90 CAPSULE | Refills: 1 | Status: SHIPPED | OUTPATIENT
Start: 2020-09-02 | End: 2021-02-26 | Stop reason: SDUPTHER

## 2020-09-02 RX ORDER — GABAPENTIN 300 MG/1
300 CAPSULE ORAL NIGHTLY
Qty: 90 CAPSULE | Refills: 1 | Status: CANCELLED | OUTPATIENT
Start: 2020-09-02

## 2020-09-02 NOTE — PROGRESS NOTES
Subjective   Ronit Robbins is a 60 y.o. female.     60-year-old female traumatic neuropathy right ankle    Pain   This is a chronic problem. The current episode started more than 1 year ago. The problem occurs daily. The problem has been waxing and waning. Associated symptoms include arthralgias. Associated symptoms comments: Traumatic neuropathy after ankle injury and fractures. Nothing aggravates the symptoms. Treatments tried: Gabapentin. The treatment provided significant relief.       The following portions of the patient's history were reviewed and updated as appropriate: allergies, current medications, past family history, past medical history, past social history, past surgical history and problem list.    Review of Systems   Musculoskeletal: Positive for arthralgias and back pain.   Neurological:        Neuropathy right ankle       Objective   Physical Exam   Constitutional: She is oriented to person, place, and time.   Overweight   Cardiovascular: Normal rate and regular rhythm.   Pulmonary/Chest: Effort normal and breath sounds normal.   Musculoskeletal: She exhibits tenderness. She exhibits no edema.   Remote scars right ankle from surgical correction and plate placement   Neurological: She is alert and oriented to person, place, and time.   Psychiatric: She has a normal mood and affect. Her behavior is normal. Judgment and thought content normal.   Nursing note and vitals reviewed.      Assessment/Plan   Ronit was seen today for theraputic drug monitoring and burning with urination.    Diagnoses and all orders for this visit:    Pain    Neuropathy  -     gabapentin (NEURONTIN) 300 MG capsule; Take 1 capsule by mouth Every Night.    Therapeutic drug monitoring  -     Gabapentin, Urine -    Dysuria  -     POC Urinalysis Dipstick, Multipro  -     Urine Culture - Urine, Urine, Clean Catch       Plan above-COVID-19 protection continue gabapentin  CONTROLLED SUBSTANCE TRACKING 9/10/2019 3/2/2020   Last Raj  9/10/2019 3/2/2020   Report Number [id number]   Last UDS 9/10/2019 9/10/2019   Last Controlled Substance Agreement 9/10/2019 9/10/2019

## 2020-09-04 ENCOUNTER — APPOINTMENT (OUTPATIENT)
Dept: MRI IMAGING | Facility: HOSPITAL | Age: 60
End: 2020-09-04

## 2020-09-04 ENCOUNTER — TELEPHONE (OUTPATIENT)
Dept: FAMILY MEDICINE CLINIC | Facility: CLINIC | Age: 60
End: 2020-09-04

## 2020-09-04 ENCOUNTER — HOSPITAL ENCOUNTER (EMERGENCY)
Facility: HOSPITAL | Age: 60
Discharge: HOME | End: 2020-09-04
Attending: EMERGENCY MEDICINE | Admitting: EMERGENCY MEDICINE

## 2020-09-04 VITALS
HEART RATE: 79 BPM | HEIGHT: 63 IN | OXYGEN SATURATION: 98 % | DIASTOLIC BLOOD PRESSURE: 96 MMHG | RESPIRATION RATE: 18 BRPM | SYSTOLIC BLOOD PRESSURE: 141 MMHG | BODY MASS INDEX: 26.22 KG/M2 | TEMPERATURE: 97.9 F | WEIGHT: 148 LBS

## 2020-09-04 DIAGNOSIS — H54.61 VISION LOSS, RIGHT EYE: Primary | ICD-10-CM

## 2020-09-04 LAB
CRP SERPL-MCNC: 0.33 MG/DL (ref 0–0.5)
ERYTHROCYTE [SEDIMENTATION RATE] IN BLOOD: 4 MM/HR (ref 0–20)
GABAPENTIN UR-MCNC: 101.9 UG/ML

## 2020-09-04 PROCEDURE — 99283 EMERGENCY DEPT VISIT LOW MDM: CPT

## 2020-09-04 PROCEDURE — 85651 RBC SED RATE NONAUTOMATED: CPT | Performed by: EMERGENCY MEDICINE

## 2020-09-04 PROCEDURE — 86140 C-REACTIVE PROTEIN: CPT | Performed by: EMERGENCY MEDICINE

## 2020-09-04 PROCEDURE — 70551 MRI BRAIN STEM W/O DYE: CPT

## 2020-09-04 RX ORDER — PREDNISONE 20 MG/1
20 TABLET ORAL 2 TIMES DAILY
Qty: 18 TABLET | Refills: 0 | Status: SHIPPED | OUTPATIENT
Start: 2020-09-04 | End: 2020-09-30

## 2020-09-04 RX ORDER — SODIUM CHLORIDE 0.9 % (FLUSH) 0.9 %
10 SYRINGE (ML) INJECTION AS NEEDED
Status: DISCONTINUED | OUTPATIENT
Start: 2020-09-04 | End: 2020-09-04

## 2020-09-04 NOTE — TELEPHONE ENCOUNTER
FYI:  PT HAD DECREASED VISION IN RT EYE.  SAW DR SAAVEDRA  TODAY @ [facility] [place].  SHE DID MULTIPLE TESTS AND DILATION--PRESSURE UP FROM LAST YEAR, NO BLEEDING AND RETINA OK.  THINKS IT VASCULAR RELATED-S[facility] IS REFERRING [facility] @ 1PM.

## 2020-09-04 NOTE — ED PROVIDER NOTES
Subjective   Patient presents complaining of loss of vision in her right eye.  It started yesterday and is continued today.  She denies any pain or trauma.  She denies any headache.  She did see the ophthalmologist today who did an exam of her eye and everything looked okay.  He referred her to the emergency room requesting an MRI for the possibility of optic neuritis or MS.      History provided by:  Patient   used: No    Eye Problem   Location:  Right eye  Quality: loss of vision.  Severity:  Moderate  Onset quality:  Sudden  Duration:  1 day  Timing:  Constant  Progression:  Unchanged  Chronicity:  New  Context: not burn, not chemical exposure, not contact lens problem, not direct trauma, not foreign body, not using machinery, not scratch, not smoke exposure and not UV exposure    Relieved by:  Nothing  Worsened by:  Nothing  Ineffective treatments:  None tried  Associated symptoms: no blurred vision, no crusting, no decreased vision, no discharge, no double vision, no facial rash, no headaches, no inflammation, no itching, no nausea, no numbness, no photophobia, no redness, no scotomas, no swelling, no tearing, no tingling, no vomiting and no weakness    Risk factors: no conjunctival hemorrhage, no exposure to pinkeye, no previous injury to eye, no recent herpes zoster and no recent URI        Review of Systems   Constitutional: Negative.    HENT: Negative.    Eyes: Positive for visual disturbance. Negative for blurred vision, double vision, photophobia, discharge, redness and itching.   Respiratory: Negative.    Cardiovascular: Negative.    Gastrointestinal: Negative.  Negative for nausea and vomiting.   Genitourinary: Negative.    Musculoskeletal: Negative.    Skin: Negative.    Neurological: Negative.  Negative for tingling, weakness, numbness and headaches.   Psychiatric/Behavioral: Negative.    All other systems reviewed and are negative.      Past Medical History:   Diagnosis Date   •  Hyperlipidemia    • Hypertension    • Scoliosis        No Known Allergies    Past Surgical History:   Procedure Laterality Date   • HAND SURGERY     • HYSTERECTOMY     • SPINAL FUSION     • VEIN SURGERY         Family History   Problem Relation Age of Onset   • Breast cancer Other    • No Known Problems Mother    • No Known Problems Father    • No Known Problems Sister    • Dementia Brother    • No Known Problems Son    • Heart disease Maternal Grandmother    • No Known Problems Maternal Grandfather    • Cancer Paternal Grandmother    • Cancer Paternal Grandfather        Social History     Socioeconomic History   • Marital status:      Spouse name: Not on file   • Number of children: Not on file   • Years of education: Not on file   • Highest education level: Not on file   Tobacco Use   • Smoking status: Never Smoker   • Smokeless tobacco: Never Used   Substance and Sexual Activity   • Alcohol use: Yes     Comment: occ   • Drug use: No   • Sexual activity: Defer       Prior to Admission medications    Medication Sig Start Date End Date Taking? Authorizing Provider   cholecalciferol (VITAMIN D3) 1000 UNITS tablet Take 1,000 Units by mouth Daily.    ProviderYobani MD   clobetasol (TEMOVATE) 0.05 % cream  4/21/20   ProviderYobani MD   cyclobenzaprine (FLEXERIL) 10 MG tablet Take 1 tablet by mouth 3 (Three) Times a Day As Needed for Muscle Spasms. 7/8/20   Neisha Adame APRN   doxycycline (ADOXA) 100 MG tablet Take once a day for one month and then every other day for two months. 4/1/20   Neisha Adame APRN   estradiol (VAGIFEM) 10 MCG tablet vaginal tablet Insert 1 tablet into the vagina 3 (Three) Times a Week. 7/12/19   Dmitriy Awad MD   gabapentin (NEURONTIN) 300 MG capsule Take 1 capsule by mouth Every Night. 9/2/20   Dmitriy Awad MD   lisinopril (PRINIVIL,ZESTRIL) 5 MG tablet Take 1 tablet by mouth Daily. 4/15/20   Neisha Adame APRN   meclizine (ANTIVERT) 25  MG tablet Take 1 tablet by mouth 3 (Three) Times a Day As Needed for Dizziness. 8/7/20   Neisha Adame APRN   metFORMIN (GLUCOPHAGE) 500 MG tablet Take 1 tablet by mouth 2 (Two) Times a Day With Meals. 7/8/20   Neisha Adame APRN   Multiple Vitamin (DAILY-VITAMIN PO) Take  by mouth.    Yobani Garner MD   omeprazole (priLOSEC) 20 MG capsule Take 1 capsule by mouth Daily. 5/8/20   Dmitriy Awad MD   ondansetron (ZOFRAN) 4 MG tablet Take 1 tablet by mouth Every 8 (Eight) Hours As Needed for Nausea or Vomiting. 8/7/20   Neisha Adame APRN   pravastatin (PRAVACHOL) 20 MG tablet Take 1 tablet by mouth Every Night. 8/24/20   Dmitriy Awad MD   TACLONEX 0.005-0.064 % external suspension  7/20/20   ProviderYobani MD       Medications - No data to display    Vitals:    09/04/20 1441   BP: 162/78   Pulse: 80   Resp: 16   Temp: 97.9 °F (36.6 °C)   SpO2: 100%         Objective   Physical Exam   Constitutional: She is oriented to person, place, and time. She appears well-developed and well-nourished.   HENT:   Head: Normocephalic and atraumatic.   Eyes: EOM are normal.   Pupils are both dilated from the previous exam.   Neck: Normal range of motion. Neck supple.   Musculoskeletal: Normal range of motion.   Neurological: She is alert and oriented to person, place, and time.   Psychiatric: She has a normal mood and affect. Her behavior is normal.   Nursing note and vitals reviewed.      Procedures         Lab Results (last 24 hours)     ** No results found for the last 24 hours. **          MRI Brain Without Contrast   Final Result   1. 5 mm cystic focus left temporal lobe likely a dilated perivascular   space. No acute signs of ischemia, hemorrhage, mass.   This report was finalized on 09/04/2020 16:25 by Dr. Mago Oneill MD.          ED Course  ED Course as of Sep 04 1700   Fri Sep 04, 2020   4549 Told the patient that her MRI was essentially normal.  I spoke with Dr. Dooley and he  asked us to get a CRP and a sed rate.  Talk with the patient about these but she does not want to wait on results and wants to go home.  We will go ahead and draw them and have them available.  I will go ahead and start her on some steroids just as a protective measure and she can follow-up with Dr. Dooley next week.  She is discharged in stable condition.    [TR]      ED Course User Index  [TR] Sulaiman Johnson Jr., MD          MDM  Number of Diagnoses or Management Options  Vision loss, right eye: new and requires workup     Amount and/or Complexity of Data Reviewed  Clinical lab tests: ordered  Tests in the radiology section of CPT®: ordered and reviewed  Discuss the patient with other providers: yes    Risk of Complications, Morbidity, and/or Mortality  Presenting problems: moderate  Diagnostic procedures: moderate  Management options: moderate    Patient Progress  Patient progress: stable      Final diagnoses:   Vision loss, right eye          Sulaiman Johnson Jr., MD  09/04/20 6933

## 2020-09-05 LAB
BACTERIA UR CULT: ABNORMAL
BACTERIA UR CULT: ABNORMAL
OTHER ANTIBIOTIC SUSC ISLT: ABNORMAL

## 2020-09-08 ENCOUNTER — TELEPHONE (OUTPATIENT)
Dept: FAMILY MEDICINE CLINIC | Facility: CLINIC | Age: 60
End: 2020-09-08

## 2020-09-08 RX ORDER — AMOXICILLIN 500 MG/1
500 CAPSULE ORAL 3 TIMES DAILY
Qty: 21 CAPSULE | Refills: 0 | Status: SHIPPED | OUTPATIENT
Start: 2020-09-08 | End: 2020-09-15

## 2020-09-08 NOTE — TELEPHONE ENCOUNTER
Patient called by [id number] with Dr. Dooley- advised to take lisinopril 5 mg 2 twice daily-done

## 2020-09-08 NOTE — TELEPHONE ENCOUNTER
Patient had sudden onset change of vision in right eye on Thursday.  Had eye appt and was referred to [facility].  Patient saw Dr. Dooley on Friday.  He sent patient to [facility] ER.  Patient has follow up appt with Dr. Dooley for additional testing.  Patient has some concerns from testing - left temporal lobe.  Patient states her blood pressure was high and has been high all weekend.  Patient states she has increased medication.  Took 3 Saturday and Sunday.

## 2020-09-15 RX ORDER — LISINOPRIL 10 MG/1
10 TABLET ORAL 2 TIMES DAILY
Qty: 180 TABLET | Refills: 3 | Status: SHIPPED | OUTPATIENT
Start: 2020-09-15 | End: 2021-07-23 | Stop reason: SDUPTHER

## 2020-09-15 RX ORDER — INDAPAMIDE 1.25 MG/1
1.25 TABLET, FILM COATED ORAL EVERY MORNING
Qty: 30 TABLET | Refills: 2 | Status: SHIPPED | OUTPATIENT
Start: 2020-09-15 | End: 2021-01-05 | Stop reason: SDUPTHER

## 2020-09-15 NOTE — TELEPHONE ENCOUNTER
/78-FEELS OK HASN'T BEEN BELOW 120 THAT SHE IS AWARE OF.    CURRENTLY LISINOPRIL 10MG (1) BID    [facility]

## 2020-09-30 ENCOUNTER — OFFICE VISIT (OUTPATIENT)
Dept: FAMILY MEDICINE CLINIC | Facility: CLINIC | Age: 60
End: 2020-09-30

## 2020-09-30 VITALS
HEIGHT: 63 IN | SYSTOLIC BLOOD PRESSURE: 118 MMHG | BODY MASS INDEX: 26.68 KG/M2 | DIASTOLIC BLOOD PRESSURE: 78 MMHG | OXYGEN SATURATION: 98 % | TEMPERATURE: 97.3 F | RESPIRATION RATE: 16 BRPM | HEART RATE: 99 BPM | WEIGHT: 150.58 LBS

## 2020-09-30 DIAGNOSIS — I10 ESSENTIAL HYPERTENSION: ICD-10-CM

## 2020-09-30 DIAGNOSIS — M54.9 BACK PAIN, UNSPECIFIED BACK LOCATION, UNSPECIFIED BACK PAIN LATERALITY, UNSPECIFIED CHRONICITY: ICD-10-CM

## 2020-09-30 DIAGNOSIS — M25.50 ARTHRALGIA, UNSPECIFIED JOINT: Primary | ICD-10-CM

## 2020-09-30 PROCEDURE — 99214 OFFICE O/P EST MOD 30 MIN: CPT | Performed by: FAMILY MEDICINE

## 2020-09-30 NOTE — PROGRESS NOTES
Subjective   Ronit Robbins is a 60 y.o. female.     60-year-old for management of hypertension    Hypertension  This is a chronic problem. The current episode started more than 1 year ago. The problem has been resolved since onset. The problem is controlled. Pertinent negatives include no chest pain. There are no associated agents to hypertension. Risk factors for coronary artery disease include dyslipidemia, post-menopausal state and family history. Past treatments include ACE inhibitors. Current antihypertension treatment includes ACE inhibitors. The current treatment provides significant improvement. There are no compliance problems.      Vitals:    09/30/20 1036   BP: 118/78   Pulse: 99   Resp: 16   Temp: 97.3 °F (36.3 °C)   SpO2: 98%       The following portions of the patient's history were reviewed and updated as appropriate: allergies, current medications, past family history, past medical history, past social history, past surgical history and problem list.    Review of Systems   Cardiovascular: Negative for chest pain and leg swelling.   Musculoskeletal: Positive for arthralgias and back pain.       Objective   Physical Exam  Nursing note reviewed.   Constitutional:       Appearance: Normal appearance.   Eyes:      Extraocular Movements: Extraocular movements intact.      Pupils: Pupils are equal, round, and reactive to light.   Cardiovascular:      Rate and Rhythm: Normal rate and regular rhythm.      Pulses: Normal pulses.      Heart sounds: Normal heart sounds.   Pulmonary:      Effort: Pulmonary effort is normal.      Breath sounds: Normal breath sounds.   Musculoskeletal:      Right lower leg: No edema.      Left lower leg: No edema.   Neurological:      General: No focal deficit present.      Mental Status: She is alert and oriented to person, place, and time.   Psychiatric:         Mood and Affect: Mood normal.         Behavior: Behavior normal.         Thought Content: Thought content normal.          Judgment: Judgment normal.         Patient's Body mass index is 26.67 kg/m². BMI is above normal parameters. Recommendations include: exercise counseling.      Assessment/Plan   Patient Active Problem List   Diagnosis   • Idiopathic scoliosis   • Essential hypertension   • Mixed hyperlipidemia   • Postmenopausal atrophic vaginitis     Ronit was seen today for hypertension.    Diagnoses and all orders for this visit:    Arthralgia, unspecified joint    Back pain, unspecified back location, unspecified back pain laterality, unspecified chronicity  -     XR Spine Lumbar 4+ View; Future    Essential hypertension  -     Basic Metabolic Panel       Return if symptoms worsen or fail to improve, for Next scheduled follow up.       Plan above continue same medicines flu shot at work      Electronically signed by Dmitriy Awad MD 09/30/2020

## 2020-10-01 LAB
BUN SERPL-MCNC: 24 MG/DL (ref 8–23)
BUN/CREAT SERPL: 35.8 (ref 7–25)
CALCIUM SERPL-MCNC: 9.4 MG/DL (ref 8.6–10.5)
CHLORIDE SERPL-SCNC: 95 MMOL/L (ref 98–107)
CO2 SERPL-SCNC: 27.2 MMOL/L (ref 22–29)
CREAT SERPL-MCNC: 0.67 MG/DL (ref 0.57–1)
GLUCOSE SERPL-MCNC: 93 MG/DL (ref 65–99)
POTASSIUM SERPL-SCNC: 4.2 MMOL/L (ref 3.5–5.2)
SODIUM SERPL-SCNC: 134 MMOL/L (ref 136–145)

## 2020-10-13 ENCOUNTER — FLU SHOT (OUTPATIENT)
Dept: FAMILY MEDICINE CLINIC | Facility: CLINIC | Age: 60
End: 2020-10-13

## 2020-10-13 DIAGNOSIS — Z23 NEED FOR INFLUENZA VACCINATION: ICD-10-CM

## 2020-10-13 PROCEDURE — 90471 IMMUNIZATION ADMIN: CPT | Performed by: FAMILY MEDICINE

## 2020-10-13 PROCEDURE — 90686 IIV4 VACC NO PRSV 0.5 ML IM: CPT | Performed by: FAMILY MEDICINE

## 2020-12-08 DIAGNOSIS — Z12.31 ENCOUNTER FOR SCREENING MAMMOGRAM FOR MALIGNANT NEOPLASM OF BREAST: Primary | ICD-10-CM

## 2020-12-30 ENCOUNTER — HOSPITAL ENCOUNTER (OUTPATIENT)
Dept: MAMMOGRAPHY | Facility: HOSPITAL | Age: 60
Discharge: HOME | End: 2020-12-30

## 2020-12-30 ENCOUNTER — HOSPITAL ENCOUNTER (OUTPATIENT)
Dept: GENERAL RADIOLOGY | Facility: HOSPITAL | Age: 60
Discharge: HOME | End: 2020-12-30

## 2020-12-30 DIAGNOSIS — M54.9 BACK PAIN, UNSPECIFIED BACK LOCATION, UNSPECIFIED BACK PAIN LATERALITY, UNSPECIFIED CHRONICITY: ICD-10-CM

## 2020-12-30 DIAGNOSIS — Z12.31 ENCOUNTER FOR SCREENING MAMMOGRAM FOR MALIGNANT NEOPLASM OF BREAST: ICD-10-CM

## 2020-12-30 PROCEDURE — 77067 SCR MAMMO BI INCL CAD: CPT

## 2020-12-30 PROCEDURE — 72110 X-RAY EXAM L-2 SPINE 4/>VWS: CPT

## 2020-12-30 PROCEDURE — 77063 BREAST TOMOSYNTHESIS BI: CPT

## 2021-01-05 RX ORDER — CYCLOBENZAPRINE HCL 10 MG
10 TABLET ORAL 3 TIMES DAILY PRN
Qty: 90 TABLET | Refills: 1 | Status: SHIPPED | OUTPATIENT
Start: 2021-01-05 | End: 2021-07-01 | Stop reason: SDUPTHER

## 2021-01-05 RX ORDER — INDAPAMIDE 1.25 MG/1
1.25 TABLET, FILM COATED ORAL EVERY MORNING
Qty: 90 TABLET | Refills: 1 | Status: SHIPPED | OUTPATIENT
Start: 2021-01-05 | End: 2021-07-23 | Stop reason: SDUPTHER

## 2021-01-07 ENCOUNTER — TELEPHONE (OUTPATIENT)
Dept: FAMILY MEDICINE CLINIC | Facility: CLINIC | Age: 61
End: 2021-01-07

## 2021-01-08 ENCOUNTER — TELEPHONE (OUTPATIENT)
Dept: FAMILY MEDICINE CLINIC | Facility: CLINIC | Age: 61
End: 2021-01-08

## 2021-01-26 ENCOUNTER — IMMUNIZATION (OUTPATIENT)
Dept: VACCINE CLINIC | Facility: HOSPITAL | Age: 61
End: 2021-01-26

## 2021-01-26 PROCEDURE — 91300 HC SARSCOV02 VAC 30MCG/0.3ML IM: CPT | Performed by: NURSE PRACTITIONER

## 2021-01-26 PROCEDURE — 0001A: CPT | Performed by: NURSE PRACTITIONER

## 2021-02-16 ENCOUNTER — IMMUNIZATION (OUTPATIENT)
Dept: VACCINE CLINIC | Facility: HOSPITAL | Age: 61
End: 2021-02-16

## 2021-02-16 PROCEDURE — 0002A: CPT | Performed by: THORACIC SURGERY (CARDIOTHORACIC VASCULAR SURGERY)

## 2021-02-16 PROCEDURE — 91300 HC SARSCOV02 VAC 30MCG/0.3ML IM: CPT | Performed by: THORACIC SURGERY (CARDIOTHORACIC VASCULAR SURGERY)

## 2021-02-26 DIAGNOSIS — R73.9 HYPERGLYCEMIA: ICD-10-CM

## 2021-02-26 DIAGNOSIS — G62.9 NEUROPATHY: ICD-10-CM

## 2021-02-26 RX ORDER — OMEPRAZOLE 20 MG/1
20 CAPSULE, DELAYED RELEASE ORAL DAILY
Qty: 90 CAPSULE | Refills: 3 | Status: SHIPPED | OUTPATIENT
Start: 2021-02-26 | End: 2021-05-03 | Stop reason: SDUPTHER

## 2021-02-26 RX ORDER — GABAPENTIN 300 MG/1
300 CAPSULE ORAL NIGHTLY
Qty: 90 CAPSULE | Refills: 0 | Status: SHIPPED | OUTPATIENT
Start: 2021-02-26 | End: 2022-07-29

## 2021-02-26 RX ORDER — PRAVASTATIN SODIUM 20 MG
20 TABLET ORAL NIGHTLY
Qty: 90 TABLET | Refills: 1 | Status: SHIPPED | OUTPATIENT
Start: 2021-02-26 | End: 2021-08-03 | Stop reason: SDUPTHER

## 2021-03-29 RX ORDER — MECLIZINE HYDROCHLORIDE 25 MG/1
25 TABLET ORAL 3 TIMES DAILY PRN
Qty: 90 TABLET | Refills: 1 | Status: SHIPPED | OUTPATIENT
Start: 2021-03-29 | End: 2021-08-30 | Stop reason: SDUPTHER

## 2021-04-06 RX ORDER — INDAPAMIDE 1.25 MG/1
1.25 TABLET, FILM COATED ORAL EVERY MORNING
Qty: 90 TABLET | Refills: 0 | Status: SHIPPED | OUTPATIENT
Start: 2021-04-06 | End: 2021-08-03 | Stop reason: SDUPTHER

## 2021-04-06 RX ORDER — CYCLOBENZAPRINE HCL 10 MG
10 TABLET ORAL 3 TIMES DAILY PRN
Qty: 90 TABLET | Refills: 0 | Status: SHIPPED | OUTPATIENT
Start: 2021-04-06 | End: 2021-07-01 | Stop reason: SDUPTHER

## 2021-05-03 RX ORDER — OMEPRAZOLE 20 MG/1
20 CAPSULE, DELAYED RELEASE ORAL DAILY
Qty: 90 CAPSULE | Refills: 3 | Status: SHIPPED | OUTPATIENT
Start: 2021-05-03 | End: 2022-04-22 | Stop reason: SDUPTHER

## 2021-07-01 ENCOUNTER — LAB (OUTPATIENT)
Dept: FAMILY MEDICINE CLINIC | Facility: CLINIC | Age: 61
End: 2021-07-01

## 2021-07-01 DIAGNOSIS — Z00.00 ANNUAL PHYSICAL EXAM: Primary | ICD-10-CM

## 2021-07-01 RX ORDER — CYCLOBENZAPRINE HCL 10 MG
10 TABLET ORAL 3 TIMES DAILY PRN
Qty: 90 TABLET | Refills: 3 | Status: SHIPPED | OUTPATIENT
Start: 2021-07-01 | End: 2022-03-11 | Stop reason: SDUPTHER

## 2021-07-02 LAB
ALBUMIN SERPL-MCNC: 4.8 G/DL (ref 3.5–5.2)
ALBUMIN/GLOB SERPL: 1.8 G/DL
ALP SERPL-CCNC: 86 U/L (ref 39–117)
ALT SERPL-CCNC: 15 U/L (ref 1–33)
AST SERPL-CCNC: 19 U/L (ref 1–32)
BASOPHILS # BLD AUTO: 0.02 10*3/MM3 (ref 0–0.2)
BASOPHILS NFR BLD AUTO: 0.4 % (ref 0–1.5)
BILIRUB SERPL-MCNC: 0.3 MG/DL (ref 0–1.2)
BUN SERPL-MCNC: 23 MG/DL (ref 8–23)
BUN/CREAT SERPL: 31.9 (ref 7–25)
CALCIUM SERPL-MCNC: 9.6 MG/DL (ref 8.6–10.5)
CHLORIDE SERPL-SCNC: 96 MMOL/L (ref 98–107)
CHOLEST SERPL-MCNC: 177 MG/DL (ref 0–200)
CO2 SERPL-SCNC: 26.9 MMOL/L (ref 22–29)
CREAT SERPL-MCNC: 0.72 MG/DL (ref 0.57–1)
EOSINOPHIL # BLD AUTO: 0.18 10*3/MM3 (ref 0–0.4)
EOSINOPHIL NFR BLD AUTO: 3.4 % (ref 0.3–6.2)
ERYTHROCYTE [DISTWIDTH] IN BLOOD BY AUTOMATED COUNT: 13.4 % (ref 12.3–15.4)
GLOBULIN SER CALC-MCNC: 2.6 GM/DL
GLUCOSE SERPL-MCNC: 100 MG/DL (ref 65–99)
HBA1C MFR BLD: 5.6 % (ref 4.8–5.6)
HCT VFR BLD AUTO: 42.7 % (ref 34–46.6)
HDLC SERPL-MCNC: 69 MG/DL (ref 40–60)
HGB BLD-MCNC: 14.2 G/DL (ref 12–15.9)
IMM GRANULOCYTES # BLD AUTO: 0.01 10*3/MM3 (ref 0–0.05)
IMM GRANULOCYTES NFR BLD AUTO: 0.2 % (ref 0–0.5)
LDLC SERPL CALC-MCNC: 95 MG/DL (ref 0–100)
LYMPHOCYTES # BLD AUTO: 1.59 10*3/MM3 (ref 0.7–3.1)
LYMPHOCYTES NFR BLD AUTO: 29.8 % (ref 19.6–45.3)
MCH RBC QN AUTO: 29.2 PG (ref 26.6–33)
MCHC RBC AUTO-ENTMCNC: 33.3 G/DL (ref 31.5–35.7)
MCV RBC AUTO: 87.9 FL (ref 79–97)
MONOCYTES # BLD AUTO: 0.39 10*3/MM3 (ref 0.1–0.9)
MONOCYTES NFR BLD AUTO: 7.3 % (ref 5–12)
NEUTROPHILS # BLD AUTO: 3.14 10*3/MM3 (ref 1.7–7)
NEUTROPHILS NFR BLD AUTO: 58.9 % (ref 42.7–76)
NRBC BLD AUTO-RTO: 0 /100 WBC (ref 0–0.2)
PLATELET # BLD AUTO: 309 10*3/MM3 (ref 140–450)
POTASSIUM SERPL-SCNC: 4.1 MMOL/L (ref 3.5–5.2)
PROT SERPL-MCNC: 7.4 G/DL (ref 6–8.5)
RBC # BLD AUTO: 4.86 10*6/MM3 (ref 3.77–5.28)
SODIUM SERPL-SCNC: 137 MMOL/L (ref 136–145)
T4 FREE SERPL-MCNC: 1.52 NG/DL (ref 0.93–1.7)
TRIGL SERPL-MCNC: 71 MG/DL (ref 0–150)
TSH SERPL DL<=0.005 MIU/L-ACNC: 2 UIU/ML (ref 0.27–4.2)
VLDLC SERPL CALC-MCNC: 13 MG/DL (ref 5–40)
WBC # BLD AUTO: 5.33 10*3/MM3 (ref 3.4–10.8)

## 2021-07-23 ENCOUNTER — OFFICE VISIT (OUTPATIENT)
Dept: FAMILY MEDICINE CLINIC | Facility: CLINIC | Age: 61
End: 2021-07-23

## 2021-07-23 VITALS
HEIGHT: 63 IN | WEIGHT: 152.8 LBS | TEMPERATURE: 97.9 F | HEART RATE: 105 BPM | BODY MASS INDEX: 27.07 KG/M2 | RESPIRATION RATE: 18 BRPM | SYSTOLIC BLOOD PRESSURE: 108 MMHG | OXYGEN SATURATION: 98 % | DIASTOLIC BLOOD PRESSURE: 66 MMHG

## 2021-07-23 DIAGNOSIS — R73.9 HYPERGLYCEMIA: ICD-10-CM

## 2021-07-23 DIAGNOSIS — Z00.00 ANNUAL PHYSICAL EXAM: ICD-10-CM

## 2021-07-23 DIAGNOSIS — Z12.31 BREAST CANCER SCREENING BY MAMMOGRAM: Primary | ICD-10-CM

## 2021-07-23 PROCEDURE — 99396 PREV VISIT EST AGE 40-64: CPT | Performed by: FAMILY MEDICINE

## 2021-07-23 NOTE — PROGRESS NOTES
Subjective   Ronit Robbins is a 61 y.o. female.     61-year-old female with history of hyperlipidemia hypertension       The following portions of the patient's history were reviewed and updated as appropriate: allergies, current medications, past family history, past medical history, past social history, past surgical history and problem list.    Review of Systems   Cardiovascular: Negative for chest pain and leg swelling.   Gastrointestinal:        Cologuard up-to-date   Genitourinary:        Pelvic floor weakness and urinary rectal incontinence at times   Musculoskeletal: Positive for arthralgias and back pain.        Traumatic neuropathy right ankle   All other systems reviewed and are negative.      Objective   Physical Exam  Vitals and nursing note reviewed.   Constitutional:       Appearance: Normal appearance.   HENT:      Right Ear: Tympanic membrane and ear canal normal.      Left Ear: Tympanic membrane and ear canal normal.   Eyes:      Extraocular Movements: Extraocular movements intact.      Pupils: Pupils are equal, round, and reactive to light.   Neck:      Vascular: No carotid bruit.   Cardiovascular:      Rate and Rhythm: Normal rate and regular rhythm.      Pulses: Normal pulses.      Heart sounds: Normal heart sounds.   Pulmonary:      Effort: Pulmonary effort is normal.      Breath sounds: Normal breath sounds.      Comments:  breast no masses noted  Abdominal:      General: Abdomen is flat.      Palpations: Abdomen is soft. There is no mass.      Tenderness: There is no abdominal tenderness.   Genitourinary:     Comments: Surgical past  Musculoskeletal:      Right lower leg: No edema.      Left lower leg: No edema.   Lymphadenopathy:      Cervical: No cervical adenopathy.   Skin:     General: Skin is warm and dry.      Capillary Refill: Capillary refill takes less than 2 seconds.   Neurological:      General: No focal deficit present.      Mental Status: She is alert and oriented to person,  place, and time.   Psychiatric:         Mood and Affect: Mood normal.         Behavior: Behavior normal.         Thought Content: Thought content normal.         Judgment: Judgment normal.         Assessment/Plan   Problems Addressed this Visit     None      Visit Diagnoses     Breast cancer screening by mammogram    -  Primary    Relevant Orders    Mammo Screening Digital Tomosynthesis Bilateral With CAD    Annual physical exam          Diagnoses       Codes Comments    Breast cancer screening by mammogram    -  Primary ICD-10-CM: Z12.31  ICD-9-CM: V76.12     Annual physical exam     ICD-10-CM: Z00.00  ICD-9-CM: V70.0           Plan above-continue Metformin etc.-had Covid shots  CONTROLLED SUBSTANCE TRACKING 9/10/2019 3/2/2020   Last Raj 9/10/2019 3/2/2020   Report Number [id number]   Last UDS 9/10/2019 9/10/2019   Last Controlled Substance Agreement 9/10/2019 9/10/2019

## 2021-07-23 NOTE — PATIENT INSTRUCTIONS
Mediterranean Diet  A Mediterranean diet refers to food and lifestyle choices that are based on the traditions of countries located on the [place]. This way of eating has been shown to help prevent certain conditions and improve outcomes for people who have chronic diseases, like kidney disease and heart disease.  What are tips for following this plan?  Lifestyle  · Cook and eat meals together with your family, when possible.  · Drink enough fluid to keep your urine clear or pale yellow.  · Be physically active every day. This includes:  ? Aerobic exercise like running or swimming.  ? Leisure activities like gardening, walking, or housework.  · Get 7-8 hours of sleep each night.  · If recommended by your health care provider, drink red wine in moderation. This means 1 glass a day for nonpregnant women and 2 glasses a day for men. A glass of wine equals 5 oz (150 mL).  Reading food labels    · Check the serving size of packaged foods. For foods such as rice and pasta, the serving size refers to the amount of cooked product, not dry.  · Check the total fat in packaged foods. Avoid foods that have saturated fat or trans fats.  · Check the ingredients list for added sugars, such as corn syrup.  Shopping  · At the grocery store, buy most of your food from the areas near the walls of the store. This includes:  ? Fresh fruits and vegetables (produce).  ? Grains, beans, nuts, and seeds. Some of these may be available in unpackaged forms or large amounts (in bulk).  ? Fresh seafood.  ? Poultry and eggs.  ? Low-fat dairy products.  · Buy whole ingredients instead of prepackaged foods.  · Buy fresh fruits and vegetables in-season from local farmers markets.  · Buy frozen fruits and vegetables in resealable bags.  · If you do not have access to quality fresh seafood, buy precooked frozen shrimp or canned fish, such as tuna, salmon, or sardines.  · Buy small amounts of raw or cooked vegetables, salads, or olives from  the deli or salad bar at your store.  · Stock your pantry so you always have certain foods on hand, such as olive oil, canned tuna, canned tomatoes, rice, pasta, and beans.  Cooking  · Cook foods with extra-virgin olive oil instead of using butter or other vegetable oils.  · Have meat as a side dish, and have vegetables or grains as your main dish. This means having meat in small portions or adding small amounts of meat to foods like pasta or stew.  · Use beans or vegetables instead of meat in common dishes like chili or lasagna.  · [place] with different cooking methods. Try roasting or broiling vegetables instead of steaming or sautéeing them.  · Add frozen vegetables to soups, stews, pasta, or rice.  · Add nuts or seeds for added healthy fat at each meal. You can add these to yogurt, salads, or vegetable dishes.  · Marinate fish or vegetables using olive oil, lemon juice, garlic, and fresh herbs.  Meal planning    · Plan to eat 1 vegetarian meal one day each week. Try to work up to 2 vegetarian meals, if possible.  · Eat seafood 2 or more times a week.  · Have healthy snacks readily available, such as:  ? Vegetable sticks with hummus.  ? Greek yogurt.  ? Fruit and nut trail mix.  · Eat balanced meals throughout the week. This includes:  ? Fruit: 2-3 servings a day  ? Vegetables: 4-5 servings a day  ? Low-fat dairy: 2 servings a day  ? Fish, poultry, or lean meat: 1 serving a day  ? Beans and legumes: 2 or more servings a week  ? Nuts and seeds: 1-2 servings a day  ? Whole grains: 6-8 servings a day  ? Extra-virgin olive oil: 3-4 servings a day  · Limit red meat and sweets to only a few servings a month  What are my food choices?  · Mediterranean diet  ? Recommended  § Grains: Whole-grain pasta. Brown rice. Bulgar wheat. Polenta. Couscous. Whole-wheat bread. Oatmeal. Quinoa.  § Vegetables: Artichokes. Beets. Broccoli. Cabbage. Carrots. Eggplant. Green beans. Chard. Kale. Spinach. Onions. Leeks. Peas. Squash.  Tomatoes. Peppers. Radishes.  § Fruits: Apples. Apricots. Avocado. Berries. Bananas. Cherries. Dates. Figs. Grapes. Tiffanie. Melon. Oranges. Peaches. Plums. Pomegranate.  § Meats and other protein foods: Beans. Almonds. Sunflower seeds. Pine nuts. Peanuts. Cod. [place]. Scallops. Shrimp. Tuna. Tilapia. Clams. Oysters. Eggs.  § Dairy: Low-fat milk. Cheese. Greek yogurt.  § Beverages: Water. Red wine. Herbal tea.  § Fats and oils: Extra virgin olive oil. Avocado oil. Grape seed oil.  § Sweets and desserts: Greek yogurt with honey. Baked apples. Poached pears. Trail mix.  § Seasoning and other foods: Basil. Cilantro. Coriander. Cumin. Mint. Parsley. Bin. Rosemary. Tarragon. Garlic. Oregano. Thyme. Pepper. Balsalmic vinegar. Tahini. Hummus. Tomato sauce. Olives. Mushrooms.  ? Limit these  § Grains: Prepackaged pasta or rice dishes. Prepackaged cereal with added sugar.  § Vegetables: Deep fried potatoes (french fries).  § Fruits: Fruit canned in syrup.  § Meats and other protein foods: Beef. Pork. Lamb. Poultry with skin. Hot dogs. [place].  § Dairy: Ice cream. Sour cream. Whole milk.  § Beverages: Juice. Sugar-sweetened soft drinks. Beer. Liquor and spirits.  § Fats and oils: Butter. Canola oil. Vegetable oil. Beef fat (tallow). Lard.  § Sweets and desserts: Cookies. Cakes. Pies. Candy.  § Seasoning and other foods: Mayonnaise. Premade sauces and marinades.  The items listed may not be a complete list. Talk with your dietitian about what dietary choices are right for you.  Summary  · The Mediterranean diet includes both food and lifestyle choices.  · Eat a variety of fresh fruits and vegetables, beans, nuts, seeds, and whole grains.  · Limit the amount of red meat and sweets that you eat.  · Talk with your health care provider about whether it is safe for you to drink red wine in moderation. This means 1 glass a day for nonpregnant women and 2 glasses a day for men. A glass of wine equals 5 oz (150 mL).  This information  is not intended to replace advice given to you by your health care provider. Make sure you discuss any questions you have with your health care provider.  Document Revised: 08/17/2017 Document Reviewed: 08/10/2017  [employer] Patient Education © 2020 [employer]  Exercising to Stay Healthy  To become healthy and stay healthy, it is recommended that you do moderate-intensity and vigorous-intensity exercise. You can tell that you are exercising at a moderate intensity if your heart starts beating faster and you start breathing faster but can still hold a conversation. You can tell that you are exercising at a vigorous intensity if you are breathing much harder and faster and cannot hold a conversation while exercising.  Exercising regularly is important. It has many health benefits, such as:  · Improving overall fitness, flexibility, and endurance.  · Increasing bone density.  · Helping with weight control.  · Decreasing body fat.  · Increasing muscle strength.  · Reducing stress and tension.  · Improving overall health.  How often should I exercise?  Choose an activity that you enjoy, and set realistic goals. Your health care provider can help you make an activity plan that works for you.  Exercise regularly as told by your health care provider. This may include:  · Doing strength training two times a week, such as:  ? Lifting weights.  ? Using resistance bands.  ? Push-ups.  ? Sit-ups.  ? Yoga.  · Doing a certain intensity of exercise for a given amount of time. Choose from these options:  ? A total of 150 minutes of moderate-intensity exercise every week.  ? A total of 75 minutes of vigorous-intensity exercise every week.  ? A mix of moderate-intensity and vigorous-intensity exercise every week.  Children, pregnant women, people who have not exercised regularly, people who are overweight, and older adults may need to talk with a health care provider about what activities are safe to do. If you have a medical  condition, be sure to talk with your health care provider before you start a new exercise program.  What are some exercise ideas?  Moderate-intensity exercise ideas include:  · Walking 1 mile (1.6 km) in about 15 minutes.  · Biking.  · Hiking.  · Golfing.  · Dancing.  · Water aerobics.  Vigorous-intensity exercise ideas include:  · Walking 4.5 miles (7.2 km) or more in about 1 hour.  · Jogging or running 5 miles (8 km) in about 1 hour.  · Biking 10 miles (16.1 km) or more in about 1 hour.  · Lap swimming.  · Roller-skating or in-line skating.  · Cross-country skiing.  · Vigorous competitive sports, such as football, basketball, and soccer.  · Jumping rope.  · Aerobic dancing.  What are some everyday activities that can help me to get exercise?  · Yard work, such as:  ? Pushing a .  ? Raking and bagging leaves.  · Washing your car.  · Pushing a stroller.  · Shoveling snow.  · Gardening.  · Washing windows or floors.  How can I be more active in my day-to-day activities?  · Use stairs instead of an elevator.  · Take a walk during your lunch break.  · If you drive, park your car farther away from your work or school.  · If you take public transportation, get off one stop early and walk the rest of the way.  · Stand up or walk around during all of your indoor phone calls.  · Get up, stretch, and walk around every 30 minutes throughout the day.  · Enjoy exercise with a friend. Support to continue exercising will help you keep a regular routine of activity.  What guidelines can I follow while exercising?  · Before you start a new exercise program, talk with your health care provider.  · Do not exercise so much that you hurt yourself, feel dizzy, or get very short of breath.  · Wear comfortable clothes and wear shoes with good support.  · Drink plenty of water while you exercise to prevent dehydration or heat stroke.  · Work out until your breathing and your heartbeat get faster.  Where to find more  information  · [place] Department of Health and Human Services: www.hhs.gov  · Centers for Disease Control and Prevention (CDC): www.cdc.gov  Summary  · Exercising regularly is important. It will improve your overall fitness, flexibility, and endurance.  · Regular exercise also will improve your overall health. It can help you control your weight, reduce stress, and improve your bone density.  · Do not exercise so much that you hurt yourself, feel dizzy, or get very short of breath.  · Before you start a new exercise program, talk with your health care provider.  This information is not intended to replace advice given to you by your health care provider. Make sure you discuss any questions you have with your health care provider.  Document Revised: 11/30/2018 Document Reviewed: 11/08/2018  [employer] Patient Education © 2021 [employer]

## 2021-08-04 RX ORDER — PRAVASTATIN SODIUM 20 MG
20 TABLET ORAL NIGHTLY
Qty: 90 TABLET | Refills: 3 | Status: SHIPPED | OUTPATIENT
Start: 2021-08-04 | End: 2022-08-15 | Stop reason: SDUPTHER

## 2021-08-04 RX ORDER — INDAPAMIDE 1.25 MG/1
1.25 TABLET, FILM COATED ORAL EVERY MORNING
Qty: 90 TABLET | Refills: 3 | Status: SHIPPED | OUTPATIENT
Start: 2021-08-04 | End: 2022-09-19 | Stop reason: SDUPTHER

## 2021-08-04 RX ORDER — LISINOPRIL 10 MG/1
10 TABLET ORAL 2 TIMES DAILY
Qty: 180 TABLET | Refills: 3 | Status: SHIPPED | OUTPATIENT
Start: 2021-08-04 | End: 2022-09-19 | Stop reason: SDUPTHER

## 2021-08-04 NOTE — TELEPHONE ENCOUNTER
Rx Refill Note  Requested Prescriptions     Pending Prescriptions Disp Refills   • pravastatin (PRAVACHOL) 20 MG tablet 90 tablet 1     Sig: Take 1 tablet by mouth Every Night.   • lisinopril (PRINIVIL,ZESTRIL) 10 MG tablet 180 tablet 3     Sig: Take 1 tablet by mouth 2 (two) times a day.   • indapamide (LOZOL) 1.25 MG tablet 90 tablet 0     Sig: Take 1 tablet by mouth Every Morning.      Last office visit with prescribing clinician: 7/23/2021      Next office visit with prescribing clinician: Visit date not found            Mago Bal MA  08/04/21, 07:29 CDT

## 2021-08-30 RX ORDER — ONDANSETRON 4 MG/1
4 TABLET, FILM COATED ORAL EVERY 8 HOURS PRN
Qty: 10 TABLET | Refills: 2 | Status: SHIPPED | OUTPATIENT
Start: 2021-08-30

## 2021-08-30 RX ORDER — MECLIZINE HYDROCHLORIDE 25 MG/1
25 TABLET ORAL 3 TIMES DAILY PRN
Qty: 90 TABLET | Refills: 3 | Status: SHIPPED | OUTPATIENT
Start: 2021-08-30 | End: 2022-09-12 | Stop reason: SDUPTHER

## 2021-08-30 NOTE — TELEPHONE ENCOUNTER
Rx Refill Note  Requested Prescriptions     Pending Prescriptions Disp Refills   • ondansetron (ZOFRAN) 4 MG tablet 10 tablet 2     Sig: Take 1 tablet by mouth Every 8 (Eight) Hours As Needed for Nausea or Vomiting.   • meclizine (ANTIVERT) 25 MG tablet 90 tablet 3     Sig: Take 1 tablet by mouth 3 (Three) Times a Day As Needed for Dizziness.      Last office visit with prescribing clinician: 7/23/2021      Next office visit with prescribing clinician: Visit date not found            Rivera John Rep  08/30/21, 13:20 CDT

## 2021-10-07 ENCOUNTER — FLU SHOT (OUTPATIENT)
Dept: FAMILY MEDICINE CLINIC | Facility: CLINIC | Age: 61
End: 2021-10-07

## 2021-10-07 DIAGNOSIS — Z23 NEED FOR INFLUENZA VACCINATION: Primary | ICD-10-CM

## 2021-10-07 PROCEDURE — 90471 IMMUNIZATION ADMIN: CPT | Performed by: FAMILY MEDICINE

## 2021-10-07 PROCEDURE — 90686 IIV4 VACC NO PRSV 0.5 ML IM: CPT | Performed by: FAMILY MEDICINE

## 2021-10-20 DIAGNOSIS — R73.9 HYPERGLYCEMIA: ICD-10-CM

## 2021-10-20 NOTE — TELEPHONE ENCOUNTER
Rx Refill Note  Requested Prescriptions     Pending Prescriptions Disp Refills   • metFORMIN (GLUCOPHAGE) 500 MG tablet 180 tablet 1     Sig: Take 1 tablet by mouth 2 (Two) Times a Day With Meals.      Last office visit with prescribing clinician: 7/23/2021      Next office visit with prescribing clinician: Visit date not found            Mago aBl MA  10/20/21, 11:11 CDT

## 2021-12-28 DIAGNOSIS — R32 URINARY INCONTINENCE, UNSPECIFIED TYPE: Primary | ICD-10-CM

## 2022-01-19 ENCOUNTER — HOSPITAL ENCOUNTER (OUTPATIENT)
Dept: PHYSICAL THERAPY | Facility: HOSPITAL | Age: 62
Setting detail: THERAPIES SERIES
Discharge: HOME | End: 2022-01-19

## 2022-01-19 DIAGNOSIS — N39.3 SUI (STRESS URINARY INCONTINENCE, FEMALE): Primary | ICD-10-CM

## 2022-01-19 PROCEDURE — 97162 PT EVAL MOD COMPLEX 30 MIN: CPT | Performed by: PHYSICAL THERAPIST

## 2022-01-19 NOTE — THERAPY EVALUATION
Outpatient Physical Therapy Pelvic Health Initial Evaluation  [facility]     Patient Name: Ronit Robbins  : 1960  MRN: [id number]  Today's Date: 2022        Visit Date: 2022   Visit Number:   Recheck: 22  Insurance: [employer]    Patient Active Problem List   Diagnosis   • Idiopathic scoliosis   • Essential hypertension   • Mixed hyperlipidemia   • Postmenopausal atrophic vaginitis        Past Medical History:   Diagnosis Date   • Hyperlipidemia    • Hypertension    • Scoliosis         Past Surgical History:   Procedure Laterality Date   • HAND SURGERY     • HYSTERECTOMY     • SPINAL FUSION     • VEIN SURGERY       Current Outpatient Medications on File Prior to Encounter   Medication Sig Dispense Refill   • calcipotriene-betamethasone (TACLONEX SCALP) 0.005-0.064 % external suspension Apply to scalp twice weekly as directed for psoriasis. 60 g 1   • cholecalciferol (VITAMIN D3) 1000 UNITS tablet Take 1,000 Units by mouth Daily.     • clobetasol (TEMOVATE) 0.05 % cream      • cyclobenzaprine (FLEXERIL) 10 MG tablet Take 1 tablet by mouth 3 (Three) Times a Day As Needed for Muscle Spasms. 90 tablet 3   • estradiol (VAGIFEM) 10 MCG tablet vaginal tablet Insert 1 tablet into the vagina 3 (Three) Times a Week. 36 tablet 2   • gabapentin (NEURONTIN) 300 MG capsule Take 1 capsule by mouth Every Night. 90 capsule 0   • indapamide (LOZOL) 1.25 MG tablet Take 1 tablet by mouth Every Morning. 90 tablet 3   • lisinopril (PRINIVIL,ZESTRIL) 10 MG tablet Take 1 tablet by mouth 2 (two) times a day. 180 tablet 3   • meclizine (ANTIVERT) 25 MG tablet Take 1 tablet by mouth 3 (Three) Times a Day As Needed for Dizziness. 90 tablet 3   • metFORMIN (GLUCOPHAGE) 500 MG tablet Take 1 tablet by mouth 2 (Two) Times a Day With Meals. 180 tablet 3   • mometasone (ELOCON) 0.1 % solution Apply topically to the scalp as directed 3 (Three) Times a Week. 30 mL 1   • Multiple Vitamin (DAILY-VITAMIN PO) Take  by  mouth.     • omeprazole (priLOSEC) 20 MG capsule Take 1 capsule by mouth Daily. Pt request meds be mailed 90 capsule 3   • ondansetron (ZOFRAN) 4 MG tablet Take 1 tablet by mouth Every 8 (Eight) Hours As Needed for Nausea or Vomiting. 10 tablet 2   • pravastatin (PRAVACHOL) 20 MG tablet Take 1 tablet by mouth Every Night. 90 tablet 3   • Stannous Fluoride (Gel-Justo) 0.4 % gel Use as directed once a day before bedtime. 244 g 0   • Stannous Fluoride (Gel-Justo) 0.4 % gel Take as directed 244 g 5   • TACLONEX 0.005-0.064 % external suspension        No current facility-administered medications on file prior to encounter.     No Known Allergies      Visit Dx:    ICD-10-CM ICD-9-CM   1. [facility] (stress urinary incontinence, female)  N39.3 625.6            Pelvic Health     Row Name 01/19/22 1100             Pregnancy Questions    Number of Pregnancies 1  -SW      Number of Children 1  -SW              Pelvic Floor Muscle    Patient/Parent/Guardian Consented to Internal Pelvic Floor Exam Yes  -SW      Strength (Right) 3: Squeeze with/without lift  -SW      Strength (Left) 3: Squeeze with/without lift  -SW      Symmetry of Sustained Maximal Contraction Symmetrical  -SW      Endurance (Ability to Hold Maximal Contraction) 10 sec  -SW      # of Reps of Maximal Contractions while Maintaining Endurance and Strength 5 sets  -SW      Fast Contraction (# of 1 sec contractions performed) 10  -SW      Internal Pelvic Floor Comments MMT 3/5; noted post floor elevation but reduced lateral wall closure to enhance circumferential closure vaginally.  Superficial layer without tenderness noted.  Mild along ischiocavernosus bilaterally.  Levator ani group with magnified myofascail thichening along L wall>R though present bilaterally.  Loss of rugae within vaginal cuff.  No evidence of caruncle noted.  Ant wall laxity noted with grade II hypermobility detected with 2+ station.  -SW      External Pelvic Floor Comments Normal perineum upon  inspection.  No redness noted.  No discharge present.  No regions of hypopigmentation noted.  -SW              Observations    Perineal Observation Performed? Yes  -SW      Posture Observations posturally aware with all positions. Excellent demo of diaphragmatic breathing techniques. Able to coordinate PF contraction with exhalation of breath.  -SW              Observation of Contraction in Perineum    Anal Table [place] Present  -SW      Perineal Body Lift Present  -SW              Pelvic Floor    Ability to Isolate Contraction of Pelvic Floor Yes  -SW      Overflow from Adjacent Muscles --  with endurance inc likelihood of use with TA and gluts; focused PF activation brought down amplitudes.  -SW              Prolapse (Pop-Q)    Cystocele Stage II  -SW      Prolapse Comments slight hypermobility detected with 2+ station  -SW              SEMG Evaluation    Pelvic Floor Electrode Internal Vaginal  -SW      Baseline Resting Yes  -SW      SEMG Evaluation Comments normal baselines achieved in supine position.  Able to show good long breaths with PF activation at amplitudes of 10+mv.  Endurance holds x 10 sec but noted inc TA activity and slight glut activation with endurance contractions.  When focused with PF only, overall amplitude reduced.  -SW              Education Provided On:    Education Points HEP; Behavioral modifications  bladder diary  -SW      Method of Delivery Verbal; Demonstration; Written  -SW      Education Provided To Patient  -SW      Level of Understanding Teach back education performed; Verbalized; Demonstrated  -SW      HEP Comments bladder diary; diaphragmatic breathing with PF contractions  -SW              Outcome Measures    Outcome Measure Options PFDI-20  score of 191.2  -SW              Pelvic Organ Prolapse Distress Inventory 6 (POPDI-6)    Do you usually experience pressure in the lower abdomen? 1  -SW      Do you usually experience heaviness or dullness in the pelvic area 2  -SW      Do you  usually have a bulge or something falling out that you can see or feel in your vaginal area? 0  -SW      Do you ever have to push on the vagina or around the rectum to have or complete a bowel movement? 3  -SW      Do you usually experience a feeling of incomplete bladder emptying? 4  -SW      Do you ever have to push up on a bulge in the vaginal area with your fingers to start or complete urination? 0  -SW      POPDI-6 Score 1.67  -SW              Colorectal-Anal Distress Inventory 8 (CRAD-8)    Do you feel you need to strain too hard to have a bowel movement? 2  -SW      Do you feel you have not completely emptied your bowels at the end of a bowel movement? 2  -SW      Do you usually lose stool beyond your control if your stool is well formed? 0  -SW      Do you usually lose stool beyond your control if your stool is loose? 4  -SW      Do you usually lose gas from your rectum beyond your control? 3  -SW      Do you usually have pain when you pass your stool? 0  -SW      Do you experience a strong sense of urgency and have to rush to the bathroom to have a bowel movement 3  -SW      Does part of your bowel ever pass through the rectum and bulge outside during or after a bowel movement? 0  -SW      CRAD-8 Score 1.75  -SW              Urinary Distress Inventory 6 (FAISAL-6)    Do you usually experience frequent urination? 4  -SW      Do you usually experience urine leakage associated with a feeling of urgency, that is, a strong sensation of needing to go to the bathroom? 4  -SW      Do you usually experience urine leakage related to coughing, sneezing, or laughing? 3  -SW      Do you usually experience small amounts of urine leakage(that is, drops)? 3  -SW      Do you usually experience difficulty emptying your bladder? 3  -SW      Do you usually experience pain or discomfort in the lower abdomen or genital region? 3  -SW      FAISAL-6 Score 3.33  -SW            User Key  (r) = Recorded By, (t) = Taken By, (c) = Cosigned  By    Initials Name Provider Type    Alma Kaba, PT DPT Physical Therapist               PT Ortho     Row Name 01/19/22 1100       Subjective Comments    Subjective Comments [facility] initiated for some time but has worsened over past year or so.  I have had hysterectomy but no hormone replacement.  Pads all the time.  Itching and irritation also noted.  Void frequency  about 3x in morning, evening 3x/evening and 1-2 times nocturia reported.  Feeling of urgency typically causes the leaks.  A little with sneeze and cough, but more frequently with urgency.  Pain noted with urgency in lower abdomen.  Wine also enjoyed but gets immediate sensation of pain associated.  Fused from T12-L4.  Last xray showed ant spurring and is tilted.  Gets some radicular pain.  Used Gabapentin for managmeent. Constipated to diarrhea flux.  [place] stool scale 4.  No use of Miralax or Colace.  Most of time bowel activity is daily. Ab work used for strengthening.  General health is good.  -SW       Subjective Pain    Able to rate subjective pain? yes  -SW          User Key  (r) = Recorded By, (t) = Taken By, (c) = Cosigned By    Initials Name Provider Type    Alma Kaba, PT DPT Physical Therapist                            PT Assessment/Plan     Row Name 01/19/22 1100          PT Assessment    Functional Limitations Limitation in home management; Limitations in community activities; Performance in leisure activities; Performance in self-care ADL; Performance in work activities; Performance in sport activities  -     Impairments Impaired muscle endurance; Impaired muscle power; Muscle strength; Other (comment)  Urinary incontinence  -SW     Assessment Comments Patient is a 60 yo female presenting to clinic with c/o pelvic floor weakness with secondary UI and urgency/frequency.  Her condition has been ongoing for some time but has worsened over past year.  Pt is s/p lumbar fusion for scoliotic changes, in addition to  hysterectomy whereby she has not be completing vaginal supplementation.  Pt is an excellent candidate for pelvic floor therapy to assist with problems of urgency/frequency and UI while improving QOL.  -     Rehab Potential Good  -SW     Patient/caregiver participated in establishment of treatment plan and goals Yes  -SW     Patient would benefit from skilled therapy intervention Yes  -SW            PT Plan    PT Frequency 1x/week  -     Planned CPT's? PT EVAL MOD COMPLELITY: 64098; PT RE-EVAL: 28869; PT THER PROC EA 15 MIN: 15745; PT THER ACT EA 15 MIN: 75871; PT MANUAL THERAPY EA 15 MIN: 02796; PT NEUROMUSC RE-EDUCATION EA 15 MIN: 04196; PT SELF CARE/HOME MGMT/TRAIN EA 15: 73799; PT ELECTRICAL STIM UNATTEND:   -     PT Plan Comments Bladder/bowel retraining as necessary; bladder and bowel dietary education; uptraining/downtraining as necessary based on SEMG findings.  Posture and balance activities as needed incorporating PF. Myofascial release to abdominal wall and bladder as needed.  -           User Key  (r) = Recorded By, (t) = Taken By, (c) = Cosigned By    Initials Name Provider Type    SW Alma Ortiz, PT DPT Physical Therapist                   OP Exercises     Row Name 01/19/22 1100             Subjective Comments    Subjective Comments [facility] initiated for some time but has worsened over past year or so.  I have had hysterectomy but no hormone replacement.  Pads all the time.  Itching and irritation also noted.  Void frequency  about 3x in morning, evening 3x/evening and 1-2 times nocturia reported.  Feeling of urgency typically causes the leaks.  A little with sneeze and cough, but more frequently with urgency.  Pain noted with urgency in lower abdomen.  Wine also enjoyed but gets immediate sensation of pain associated.  Fused from T12-L4.  Last xray showed ant spurring and is tilted.  Gets some radicular pain.  Used Gabapentin for managmeent. Constipated to diarrhea flux.  [place] stool  scale 4.  No use of Miralax or Colace.  Most of time bowel activity is daily. Ab work used for strengthening.  General health is good.  -              Subjective Pain    Able to rate subjective pain? yes  -            User Key  (r) = Recorded By, (t) = Taken By, (c) = Cosigned By    Initials Name Provider Type    Alma Kaba, PT DPT Physical Therapist                             PT OP Goals     Row Name 01/19/22 1500          PT Short Term Goals    STG Date to Achieve 02/17/22  -     STG 1 patient to complete bladder diary x 3 days and return to clinic at next visit.  -     STG 1 Progress New  -     STG 2 patient to verbalize dietary irritants and reduce/eliminate those from intake as to dec urgency of bladder.  -     STG 2 Progress New  -     STG 3 patient to demo toileting position as to improve elimination of urine with effectiveness vs required straining or double void for clearance.  -     STG 3 Progress New  -     STG 4 patient to consume appropriate hydration amounts of 2/3 good hydration and 1/3 irritant or less to dec urgency to bladder.  -     STG 4 Progress New  -     STG 5 patient to begin bladder retraining at intervals of every 2 hours with gradual increments of increase as tolerant with controlled levels of urgency.  -     STG 5 Progress New  [facility]            Long Term Goals    LTG Date to Achieve 06/17/22  -     LTG 1 patient to be able to demo seated and standing SEMG readings with ability to contract timely and at amplitudes sufficient to support bladder during activity with good breath support and control  -     LTG 2 paitent to report UI improved by 70-80% overall  -     LTG 2 Progress New  -     LTG 3 PFDI SF 20 improved to 50 or less showing improved function and control.  -     LTG 3 Progress New  [facility]     LTG 4 Pt to have improved fascial mobiltiy around bladder to dec urgency due to inc abdominal tension  -     LTG 4 Progress New  -             Time Calculation    PT Goal Re-Cert Due Date 02/17/22  -           User Key  (r) = Recorded By, (t) = Taken By, (c) = Cosigned By    Initials Name Provider Type    Alma Kaba, PT DPT Physical Therapist                Therapy Education  Given: HEP  Program: New  How Provided: Verbal, Demonstration, Written  Provided to: Patient  Level of Understanding: Teach back education performed, Verbalized, Demonstrated               Time Calculation:   Start Time: 1122  Stop Time: 1235  Time Calculation (min): 73 min  PT Non-Billable Time (min): 10 min  Therapy Charges for Today     Code Description Service Date Service Provider Modifiers Qty    [id number]  PT EVAL MOD COMPLEXITY 4 1/19/2022 Alma Ortiz, PT DPT GP 1    [id number]  PT THER SUPP EA 15 MIN 1/19/2022 Alma Ortiz, PT DPT GP 1                  Alma Ortiz, PT DPT  1/19/2022

## 2022-01-20 ENCOUNTER — CLINICAL SUPPORT (OUTPATIENT)
Dept: FAMILY MEDICINE CLINIC | Facility: CLINIC | Age: 62
End: 2022-01-20

## 2022-01-20 DIAGNOSIS — Z20.822 EXPOSURE TO COVID-19 VIRUS: Primary | ICD-10-CM

## 2022-01-20 DIAGNOSIS — Z20.822 EXPOSURE TO COVID-19 VIRUS: ICD-10-CM

## 2022-01-20 PROCEDURE — 87635 SARS-COV-2 COVID-19 AMP PRB: CPT | Performed by: FAMILY MEDICINE

## 2022-01-21 ENCOUNTER — LAB REQUISITION (OUTPATIENT)
Dept: LAB | Facility: HOSPITAL | Age: 62
End: 2022-01-21

## 2022-01-21 DIAGNOSIS — Z00.00 ENCOUNTER FOR GENERAL ADULT MEDICAL EXAMINATION WITHOUT ABNORMAL FINDINGS: ICD-10-CM

## 2022-01-21 LAB — SARS-COV-2 RNA RESP QL NAA+PROBE: NOT DETECTED

## 2022-01-26 ENCOUNTER — HOSPITAL ENCOUNTER (OUTPATIENT)
Dept: PHYSICAL THERAPY | Facility: HOSPITAL | Age: 62
Setting detail: THERAPIES SERIES
Discharge: HOME | End: 2022-01-26

## 2022-01-26 DIAGNOSIS — N39.3 SUI (STRESS URINARY INCONTINENCE, FEMALE): Primary | ICD-10-CM

## 2022-01-26 PROCEDURE — 97535 SELF CARE MNGMENT TRAINING: CPT | Performed by: PHYSICAL THERAPIST

## 2022-01-26 NOTE — THERAPY TREATMENT NOTE
Outpatient Physical Therapy Pelvic Health Treatment Note  [facility]     Patient Name: Ronit Robbins  : 1960  MRN: [id number]  Today's Date: 2022        Visit Date: 2022   Visit Number:   Recheck: 22  Insurance: [facility]      Visit Dx:    ICD-10-CM ICD-9-CM   1. [facility] (stress urinary incontinence, female)  N39.3 625.6       Patient Active Problem List   Diagnosis   • Idiopathic scoliosis   • Essential hypertension   • Mixed hyperlipidemia   • Postmenopausal atrophic vaginitis         Pelvic Health     Row Name 22 1500             Subjective Comments    Subjective Comments Reports today with having completed her diary.  I have learned a lot about my body in the last session.  Had some gastro irritation the day following evaluation.  Was initially concerned with COVID exposure, but test was negative.  Thoughts now that toxins may be released from some of manual work completed.  Feeling better now and trying to stay healthy.  I thought I may should have drank more water after last session which may have helped.  -SW              Pregnancy Questions    Number of Pregnancies 1  -SW      Number of Children 1  -SW              Pelvic Floor Muscle    External Pelvic Floor Comments deferred due to behavioral training  -SW              Observations    Posture Observations patient is alert and oriented.   Brings in diary for review.  See chart note for recommendations.  -SW              SEMG Evaluation    SEMG Evaluation Comments deferred  -SW              Education Provided On:    Education Points Behavioral modifications; Information on dietary irritants to bladder/bowels  -SW      Method of Delivery Verbal; Demonstration; Written  -SW      Education Provided To Patient  -SW      Level of Understanding Teach back education performed; Verbalized; Demonstrated  -SW      HEP Comments begin bladder voids at 2 hour intervals. hydration of 60 oz with 40 oz good and 20 or less irritant.  -SW             User Key  (r) = Recorded By, (t) = Taken By, (c) = Cosigned By    Initials Name Provider Type    Alma Kaba, PT DPT Physical Therapist                 Bladder and/or Bowel Diary was reviewed this date and education completed as follows based on objective information given in diary.    Normal bladder and bowel anatomy was introduced as to better understand their function.  The normal voiding range was given with average of 6-8 times during a 24 hour period, with average of nocturia up to 1 per night post menopausal.  Urine stream was discussed as to not force, push, or strain to initiate or empty the flow of urine.  Proper toileting was reviewed as to improve overall evacuation for bladder and bowel systems.  Patient demonstrated this technique via verbal and demonstrative techniques shown by the therapist.  Three primary functions of the pelvic floor were discussed to include 1) sexual appreciation, 2) support of internal structures, and 3) sphincteric control.  Additionally, normal bladder and bowel function was discussed and patient's values, as per his/her diary, were presented and compared to that of normal function.    Patient's diary reveals 8-9 voids total per day with 1-2 average episodes of nocturia per night.  Patient void schedule varied with increments of 1-5 hour increments.  Patient presented with 1-3 episodes of leakage per day.    Patient's level of urgency ranged from 2-3 in terms of strength with 1=min urge, 2=moderate urge and 3=strong urge.  Patient received education on the urge signal that one feels as the bladder wall stretches to fill with urine.  Three stage scale given with descriptor of the stages identified as stated above.  Urge suppression techniques to help control the urge and behaviorally manage urination and/or bowel schedule were taught during this time.    Good fluid intake was discussed as to aid in the promotion of good bladder health.  Hydration formula  using body weight guide was used to calculate potential need for full hydration.  Per the bladder review, the patient consumed 41-52 total fluid oz per day with greater than 1/3 total oz fluid consisting of irritating fluid.  Per the formula, a hydration goal of 60 oz per day with 20 oz or less irritant fluids based upon body weight formula.    Dietary intake was discussed this visit as to how it too can affect the overall bladder health.  Handout was issued, as well as thorough discussion given as to irritants to bladder and how to categorize those irritants to improve overall bladder health. Suggestions were given based on response of patient's dietary intake per diary.    Patient presented with reports of bowel activity at least 2 or 3 days with Menard Stool scale of 4-7 but without reports or evidence of constipation.  No further dietary training indicated at this time, but will consider should bowels become more constipated or bladder frequency worsens vs improves.             PT Assessment/Plan     Row Name 01/26/22 1500          PT Assessment    Assessment Comments Patient's diary illlustrated mixed void frequency and urgency throughout days of tracking.  She consumes + irritants that are often larger than desired amounts estimated at 1/3 or less total from daily consumption.  Leaks were mostly with urge to urinate and during dynamic bouts of movement suggesting mixed incontinence.  Pt verbalized recommendations given to her this date.  Feels that behavioral management goals are achievable and feels she can comply with recommendations. Pt has requested need for intermittent FUs in Feb due to schedule availability.  -SW     Rehab Potential Good  -SW     Patient/caregiver participated in establishment of treatment plan and goals Yes  -SW     Patient would benefit from skilled therapy intervention Yes  -SW            PT Plan    PT Frequency 1x/week  -SW     PT Plan Comments review behavioral training; begin  uptraining of PF in supine or seated positions.  -           User Key  (r) = Recorded By, (t) = Taken By, (c) = Cosigned By    Initials Name Provider Type    Alma Kaba, PT DPT Physical Therapist                   OP Exercises     Row Name 01/26/22 1500             Subjective Comments    Subjective Comments Reports today with having completed her diary.  I have learned a lot about my body in the last session.  Had some gastro irritation the day following evaluation.  Was initially concerned with COVID exposure, but test was negative.  Thoughts now that toxins may be released from some of manual work completed.  Feeling better now and trying to stay healthy.  I thought I may should have drank more water after last session which may have helped.  -              Exercise 1    Exercise Name 1 bladder diary review  -      Additional Comments see chart note for specifics  -              Exercise 2    Exercise Name 2 bladder irritants  -      Additional Comments able to verbalize and understand need to eliminate/reduce intake as to dec urgency/UI episodes.  -              Exercise 3    Exercise Name 3 bladder retraining  -      Additional Comments begin at intervals of 2 hour increments.  Gradually move forward at intervals of 30 minutes as able after 4-7 days of training.  -              Exercise 4    Exercise Name 4 urge suppression  -      Additional Comments able to demo with teach back suppression techniques.  -            User Key  (r) = Recorded By, (t) = Taken By, (c) = Cosigned By    Initials Name Provider Type    Alma Kaba, PT DPT Physical Therapist                             PT OP Goals     Row Name 01/26/22 1500          PT Short Term Goals    STG Date to Achieve 02/17/22  -     STG 1 patient to complete bladder diary x 3 days and return to clinic at next visit.  -     STG 1 Progress Met  -     STG 2 patient to verbalize dietary irritants and reduce/eliminate  those from intake as to dec urgency of bladder.  -     STG 2 Progress New  -     STG 3 patient to demo toileting position as to improve elimination of urine with effectiveness vs required straining or double void for clearance.  -     STG 3 Progress New  -     STG 4 patient to consume appropriate hydration amounts of 2/3 good hydration and 1/3 irritant or less to dec urgency to bladder.  -     STG 4 Progress New  -     STG 5 patient to begin bladder retraining at intervals of every 2 hours with gradual increments of increase as tolerant with controlled levels of urgency.  -[facility] 5 Progress New  [facility]            Long Term Goals    LTG Date to Achieve 06/17/22  -     LTG 1 patient to be able to demo seated and standing SEMG readings with ability to contract timely and at amplitudes sufficient to support bladder during activity with good breath support and control  -     LTG 2 paitent to report UI improved by 70-80% overall  -     LTG 2 Progress New  -     LTG 3 PFDI SF 20 improved to 50 or less showing improved function and control.  -     LTG 3 Progress New  -     LTG 4 Pt to have improved fascial mobiltiy around bladder to dec urgency due to inc abdominal tension  -     LTG 4 Progress New  [facility]            Time Calculation    PT Goal Re-Cert Due Date 02/17/22  -           User Key  (r) = Recorded By, (t) = Taken By, (c) = Cosigned By    Initials Name Provider Type    Alma Kaba, PT DPT Physical Therapist                 Therapy Education  Given: Symptoms/condition management, Other (comment) (behavioral training)  Program: Reinforced, Progressed  How Provided: Verbal, Demonstration, Written  Provided to: Patient  Level of Understanding: Teach back education performed, Verbalized, Demonstrated              Time Calculation:   Start Time: 1125  Stop Time: 1230  Time Calculation (min): 65 min  Therapy Charges for Today     Code Description Service Date Service Provider Modifiers  Qty    96008083163 HC PT SELF CARE/MGMT/TRAIN EA 15 MIN 1/26/2022 Alma Ortiz, PT DPT GP 4                    Alma Ortiz, PT DPT  1/26/2022

## 2022-01-27 RX ORDER — FLUCONAZOLE 150 MG/1
TABLET ORAL
Qty: 2 TABLET | Refills: 0 | Status: SHIPPED | OUTPATIENT
Start: 2022-01-27 | End: 2022-07-29

## 2022-01-27 RX ORDER — ESTRADIOL 10 UG/1
1 INSERT VAGINAL 3 TIMES WEEKLY
Qty: 36 TABLET | Refills: 3 | Status: SHIPPED | OUTPATIENT
Start: 2022-01-28

## 2022-01-27 NOTE — TELEPHONE ENCOUNTER
PT C/O VAGINAL ITCHING AND DRYNESS--INTERIOR AND EXTERIOR--HAS USED CREAMS/TABLETS IN THE PAST.  PT IS REQUESTING MEDICATION, PLEASE.    [facility]

## 2022-01-28 NOTE — TELEPHONE ENCOUNTER
PT WAS ADVISED RX NOT ON FORMULARY AND TO CHECK WITH INSURANCE TO WHAT IS COVERED AND LET US KNOW.

## 2022-02-09 ENCOUNTER — HOSPITAL ENCOUNTER (OUTPATIENT)
Dept: PHYSICAL THERAPY | Facility: HOSPITAL | Age: 62
Setting detail: THERAPIES SERIES
Discharge: HOME | End: 2022-02-09

## 2022-02-09 DIAGNOSIS — N39.3 SUI (STRESS URINARY INCONTINENCE, FEMALE): Primary | ICD-10-CM

## 2022-02-09 PROCEDURE — 97140 MANUAL THERAPY 1/> REGIONS: CPT | Performed by: PHYSICAL THERAPIST

## 2022-02-09 PROCEDURE — 97110 THERAPEUTIC EXERCISES: CPT | Performed by: PHYSICAL THERAPIST

## 2022-02-09 NOTE — THERAPY TREATMENT NOTE
Outpatient Physical Therapy Pelvic Health Treatment Note  [facility]     Patient Name: Ronit Robbins  : 1960  MRN: [id number]  Today's Date: 2022        Visit Date: 2022  Visit Number: 3/3  Recheck: 22 (NEXT)  Insurance: [email or web address]    Visit Dx:    ICD-10-CM ICD-9-CM   1. [facility] (stress urinary incontinence, female)  N39.3 625.6       Patient Active Problem List   Diagnosis   • Idiopathic scoliosis   • Essential hypertension   • Mixed hyperlipidemia   • Postmenopausal atrophic vaginitis         Pelvic Health     Row Name 22 1100             Subjective Comments    Subjective Comments I think everything is going pretty good.  Following directions.  Better with minimized irritants.  About 2.5 hours to 3 hour intervals.  Easier when home for ice.  I have seen some improved leakage.  3 times since last visit. Pt restarted hormonal cream and believes it to be helping with itching complaints.  -SW              Pregnancy Questions    Number of Pregnancies 1  -SW      Number of Children 1  -SW              Pelvic Floor Muscle    Endurance (Ability to Hold Maximal Contraction) 10 sec  -SW      # of Reps of Maximal Contractions while Maintaining Endurance and Strength 10 sets  -SW      Fast Contraction (# of 1 sec contractions performed) 10  -SW              Observations    Perineal Observation Performed? Yes  -SW              Observation of Contraction in Perineum    Anal Elmo Present  -SW      Perineal Body Lift Present  -SW              Pelvic Floor    Ability to Isolate Contraction of Pelvic Floor Yes  -SW              SEMG Evaluation    Pelvic Floor Electrode Internal Vaginal  -SW      Baseline Resting Yes  -SW      SEMG Evaluation Comments supine and seated assessment completed. See chart note for exercise for description.  -SW              Education Provided On:    Education Points Behavioral modifications; HEP  -SW      Method of Delivery Verbal; Demonstration; Written  -SW       Education Provided To Patient  -      Level of Understanding Teach back education performed; Verbalized; Demonstrated  -SW      HEP Comments begin endurance contraction seated 10 sec hold x 5 reps up to 4x/day.  Gradual inc to 10 sec holds x 10 reps up to 2 x per day.  Inc void time prompts as able.  -SW            User Key  (r) = Recorded By, (t) = Taken By, (c) = Cosigned By    Initials Name Provider Type    Alma Kaba PT DPT Physical Therapist                              PT Assessment/Plan     Row Name 02/09/22 1100          PT Assessment    Assessment Comments Patient is making excellent gains with PF awareness and control.  She demonstrates ability to contract PF x 10 sec with good breath support maintained.  She is compliant with recommendations and making adjustments well.  Patient was successful with transition to seated position for training using auditory support.  Pt displays dec fascial motility along bladder and throughout abdomino-pelvic fascia among lower quadrants due to prior surgeries.  -SW     Rehab Potential Good  -SW     Patient/caregiver participated in establishment of treatment plan and goals Yes  -SW     Patient would benefit from skilled therapy intervention Yes  -SW            PT Plan    PT Frequency --  FU in early March due to pending appts for patient.  -SW     PT Plan Comments FU with reassessment in seated position.  Transition to standing and then on to functional training.  Cont myofascial work as needed.  -SW           User Key  (r) = Recorded By, (t) = Taken By, (c) = Cosigned By    Initials Name Provider Type    Alma Kaba, PT DPT Physical Therapist                   OP Exercises     Row Name 02/09/22 1100             Subjective Comments    Subjective Comments I think everything is going pretty good.  Following directions.  Better with minimized irritants.  About 2.5 hours to 3 hour intervals.  Easier when home for ice.  I have seen some improved  "leakage.  3 times since last visit. Pt restarted hormonal cream and believes it to be helping with itching complaints.  -SW              Exercise 1    Exercise Name 1 review behavioral management  -SW      Additional Comments voids at 2.5 to 3 hours.  Urgency is less.  Hydration levels are maintained.  Reduced leaks with improved subjective control.  -SW              Exercise 2    Exercise Name 2 SEMG resting supine  -SW      Time 2 2'min  -SW      Additional Comments normal baselines achieved with supine resting position .  -SW              Exercise 3    Exercise Name 3 QF SEMG supine  -SW      Reps 3 10  -SW      Additional Comments able to contract PF with full release of PF.  Initial amplitudes were low but improved with inc patient focus and contraction of PF.  Able to demo amplitudes into work zone x 10 reps with full return to baseline  -SW              Exercise 4    Exercise Name 4 SEMG endurance supine  -SW      Reps 4 10  -SW      Time 4 10 sec  -SW      Additional Comments good breath support demonstrated with endurance contraction.  Does not appear to compensate with contractions.  Good use and stability of PF for control. Fatigue noted with last two contractions with drop in amplitude slightly.  -SW              Exercise 5    Exercise Name 5 SEMG seated resting  -SW      Time 5 3'min  -SW      Additional Comments slight inc in resting tone initially.  Repositioned into \"toileting\" position which improved tone to normal baselines.  Able to maintain resting continuously thereafter.  Use of auditory cues for training.  -SW              Exercise 6    Exercise Name 6 SEMG QF seated  -SW      Reps 6 10  -SW      Additional Comments good contraction recognition with amplitudes at 3.5-4 mv with full return to baseline. Good breath support throughout.  -SW              Exercise 7    Exercise Name 7 SEMG endurance seated  -SW      Reps 7 10  -SW      Time 7 10 sec  -SW      Additional Comments good stability " noted.  Slight fatigue noted toward end with lowering amplitudes overall. Pt able to maintain composure and control with breath support.  Min to no cues required.  -            User Key  (r) = Recorded By, (t) = Taken By, (c) = Cosigned By    Initials Name Provider Type    Alma Kaba, PT DPT Physical Therapist              Manual Rx (last 36 hours)     Manual Treatments     Row Name 02/09/22 1100             Manual Rx 1    Manual Rx 1 Location abdominal fascial release  -SW              Manual Rx 2    Manual Rx 2 Location bladder mobs  -SW              Manual Rx 3    Manual Rx 3 Location scar mobilization midline abdomen  -            User Key  (r) = Recorded By, (t) = Taken By, (c) = Cosigned By    Initials Name Provider Type    Alma Kaba, PT DPT Physical Therapist                           PT OP Goals     Row Name 02/09/22 1100          PT Short Term Goals    STG Date to Achieve 02/17/22  -     STG 1 patient to complete bladder diary x 3 days and return to clinic at next visit.  -     STG 1 Progress Met  -     STG 2 patient to verbalize dietary irritants and reduce/eliminate those from intake as to dec urgency of bladder.  -     STG 2 Progress Met  -     STG 2 Progress Comments patient very aware and making adjustments to intake.  -     STG 3 patient to demo toileting position as to improve elimination of urine with effectiveness vs required straining or double void for clearance.  -     STG 3 Progress Met  -     STG 4 patient to consume appropriate hydration amounts of 2/3 good hydration and 1/3 irritant or less to dec urgency to bladder.  -     STG 4 Progress Met  -     STG 5 patient to begin bladder retraining at intervals of every 2 hours with gradual increments of increase as tolerant with controlled levels of urgency.  -     STG 5 Progress Met  -     STG 5 Progress Comments currently at 2.5 to almost 3 hour intervals.  -            Long Term Goals    LTG  Date to Achieve 06/17/22  -     LTG 1 patient to be able to demo seated and standing SEMG readings with ability to contract timely and at amplitudes sufficient to support bladder during activity with good breath support and control  -     LTG 1 Progress Progressing  -     LTG 2 paitent to report UI improved by 70-80% overall  -     LTG 2 Progress New  -     LTG 3 PFDI SF 20 improved to 50 or less showing improved function and control.  -SW     LTG 3 Progress New  -     LTG 4 Pt to have improved fascial mobiltiy around bladder to dec urgency due to inc abdominal tension  -     LTG 4 Progress New  -            Time Calculation    PT Goal Re-Cert Due Date 02/17/22  -           User Key  (r) = Recorded By, (t) = Taken By, (c) = Cosigned By    Initials Name Provider Type    Alma Kaba, PT DPT Physical Therapist                 Therapy Education  Given: HEP, Symptoms/condition management  Program: Reinforced, Progressed  How Provided: Verbal, Demonstration, Written  Provided to: Patient  Level of Understanding: Teach back education performed, Verbalized, Demonstrated              Time Calculation:   Start Time: 1119  Stop Time: 1230  Time Calculation (min): 71 min  Therapy Charges for Today     Code Description Service Date Service Provider Modifiers Qty    34526874498 HC PT MANUAL THERAPY EA 15 MIN 2/9/2022 Alma Ortiz, PT DPT GP 2    04086400016 HC PT THER PROC EA 15 MIN 2/9/2022 Alma Ortiz, PT DPT GP 3                    Alma Ortiz PT DPT  2/9/2022

## 2022-02-11 ENCOUNTER — HOSPITAL ENCOUNTER (OUTPATIENT)
Dept: MAMMOGRAPHY | Facility: HOSPITAL | Age: 62
Discharge: HOME | End: 2022-02-11
Admitting: FAMILY MEDICINE

## 2022-02-11 DIAGNOSIS — Z12.31 BREAST CANCER SCREENING BY MAMMOGRAM: ICD-10-CM

## 2022-02-11 PROCEDURE — 77063 BREAST TOMOSYNTHESIS BI: CPT

## 2022-02-11 PROCEDURE — 77067 SCR MAMMO BI INCL CAD: CPT

## 2022-02-16 ENCOUNTER — HOSPITAL ENCOUNTER (OUTPATIENT)
Dept: MAMMOGRAPHY | Facility: HOSPITAL | Age: 62
Discharge: HOME | End: 2022-02-16

## 2022-02-16 ENCOUNTER — HOSPITAL ENCOUNTER (OUTPATIENT)
Dept: ULTRASOUND IMAGING | Facility: HOSPITAL | Age: 62
Discharge: HOME | End: 2022-02-16

## 2022-02-16 DIAGNOSIS — R92.8 ABNORMAL MAMMOGRAM: ICD-10-CM

## 2022-02-16 PROCEDURE — 77065 DX MAMMO INCL CAD UNI: CPT

## 2022-02-16 PROCEDURE — G0279 TOMOSYNTHESIS, MAMMO: HCPCS

## 2022-03-02 ENCOUNTER — HOSPITAL ENCOUNTER (OUTPATIENT)
Dept: PHYSICAL THERAPY | Facility: HOSPITAL | Age: 62
Setting detail: THERAPIES SERIES
Discharge: HOME | End: 2022-03-02

## 2022-03-02 DIAGNOSIS — N39.3 SUI (STRESS URINARY INCONTINENCE, FEMALE): Primary | ICD-10-CM

## 2022-03-02 PROCEDURE — 97530 THERAPEUTIC ACTIVITIES: CPT | Performed by: PHYSICAL THERAPIST

## 2022-03-02 PROCEDURE — 97140 MANUAL THERAPY 1/> REGIONS: CPT | Performed by: PHYSICAL THERAPIST

## 2022-03-02 PROCEDURE — 97110 THERAPEUTIC EXERCISES: CPT | Performed by: PHYSICAL THERAPIST

## 2022-03-02 NOTE — THERAPY TREATMENT NOTE
Outpatient Physical Therapy Pelvic Health Progress Note  [facility]     Patient Name: Ronit Robbins  : 1960  MRN: [id number]  Today's Date: 3/2/2022        Visit Date: 2022   Visit number:   Recheck: 3/31/22  Insurance: [employer]    Visit Dx:    ICD-10-CM ICD-9-CM   1. [facility] (stress urinary incontinence, female)  N39.3 625.6       Patient Active Problem List   Diagnosis   • Idiopathic scoliosis   • Essential hypertension   • Mixed hyperlipidemia   • Postmenopausal atrophic vaginitis         Pelvic Health     Row Name 22 1200             Pregnancy Questions    Number of Pregnancies 1  -SW      Number of Children 1  -SW              Pelvic Floor Muscle    Endurance (Ability to Hold Maximal Contraction) 10 sec  -SW      # of Reps of Maximal Contractions while Maintaining Endurance and Strength 10 sets  -SW      Fast Contraction (# of 1 sec contractions performed) 10  -SW      External Pelvic Floor Comments tissues appear less irritated and with inc hydration noted.  Perineal body in neutral position without elevation or depression.  -SW              Observations    Perineal Observation Performed? Yes  -SW      Posture Observations patient is very aware of postural position and alignment.  -SW              Observation of Contraction in Perineum    Anal [place] Present  -SW      Perineal Body Lift Present  -SW              Pelvic Floor    Ability to Isolate Contraction of Pelvic Floor Yes  -SW              SEMG Evaluation    Pelvic Floor Electrode Internal Vaginal  -SW      Baseline Resting Yes  -SW      SEMG Evaluation Comments patient able to complete standing position of training with excellence.  Normal resting position achieved while standing with consistency.  Able to isolate PF and recruit at amplitudes of 7 mv or greater with QF contraction.  Endurance contractions with good stability noted with amplitudes higher than anticipated average.  Though amplitudes were slightly lower than in  seated position, she remains with excellent control of breath support and isolation for recruitment.  -SW              Education Provided On:    HEP Comments progress with HEP in standing 10 sec x 10 reps up to 3/day.  -SW            User Key  (r) = Recorded By, (t) = Taken By, (c) = Cosigned By    Initials Name Provider Type    Alma Kaba PT DPT Physical Therapist               PT Ortho     Row Name 03/02/22 1200       Subjective Comments    Subjective Comments I have been faithful to my schedule.  I really feel it is doing me good.  I feel more control than before.  Minimal urgency unless have strayed back to irritants.  I have even tried standing exercises and think I can do those fairly well. My gut even feels different.  Things feel more mobile and less stuck down.  Will need to be out for a couple of weeks and wishes to continue with intermittent follow ups.  -SW       Subjective Pain    Able to rate subjective pain? yes  -SW    Pre-Treatment Pain Level 0  -SW    Post-Treatment Pain Level 0  -SW          User Key  (r) = Recorded By, (t) = Taken By, (c) = Cosigned By    Initials Name Provider Type    Alma Kaba, PT DPT Physical Therapist                            PT Assessment/Plan     Row Name 03/02/22 1200          PT Assessment    Assessment Comments Patient has made excellent gains with PT regarding training to PF and bladder.  She reports good compliance with behavioral management and is showing less leakage and more controlled urgency patterns.  Fascia along abdominal region much improved with less adherence noted.  Slightly more thickened along R lower quadrant this date, but yet still improved from last visit.  Pt is working independently with fascia at home as well to compliment work done in clinic.  Additionally, she demonstrated good PF awareness in standing position with SEMG assessment this date.  Normal resting tone achieved and isolated PF recruitment obtained with  contractions.  Pt has been compliant with all recommendations and is seeing improvements daily.  She will continue with intermittent checks with advancements to training program over the next 2-3 visits.  All STG met and LTG 1 met.  Good progression toward other goals.  -     Rehab Potential Good  -     Patient/caregiver participated in establishment of treatment plan and goals Yes  -SW     Patient would benefit from skilled therapy intervention Yes  -SW            PT Plan    PT Frequency --  FU in 3 weeks  -     PT Plan Comments FU in 3 weeks.  Assess combined exercises and jumping jacks next visit to begin plyometric work.  -           User Key  (r) = Recorded By, (t) = Taken By, (c) = Cosigned By    Initials Name Provider Type    Alma Kaba, PT DPT Physical Therapist                   OP Exercises     Row Name 03/02/22 1200             Subjective Comments    Subjective Comments I have been faithful to my schedule.  I really feel it is doing me good.  I feel more control than before.  Minimal urgency unless have strayed back to irritants.  I have even tried standing exercises and think I can do those fairly well. My gut even feels different.  Things feel more mobile and less stuck down.  Will need to be out for a couple of weeks and wishes to continue with intermittent follow ups.  -              Subjective Pain    Able to rate subjective pain? yes  -      Pre-Treatment Pain Level 0  -SW      Post-Treatment Pain Level 0  -              Exercise 1    Exercise Name 1 reviewed behavioral techniques  -      Additional Comments continuing with timed voids.  3-4 hours consistenly.  Hydrations remains well.  Exercises daily with PF recruitment.  -              Exercise 2    Exercise Name 2 SEMG resting standing  -      Additional Comments pt able to complete normal resting tone with consistency and ease.  -              Exercise 3    Exercise Name 3 SEMG QF standing  -      Sets 3 2   -SW      Reps 3 10  -SW      Additional Comments able to show consistent recruitment patterns of PF with good breath support.  Amplitudes 7-9mv with full return to baseline post contraction.  -SW              Exercise 4    Exercise Name 4 SEMG endurance standing  -SW      Sets 4 1  -SW      Reps 4 10  -SW      Time 4 10sec  -SW      Additional Comments excellent recruitment of PF with maintained stability with each contraction.  Able to release contraction holds with 2.9mv achievement of each contraction.  Aware of breath support with each contraction. Easily completed 10 reps ea.  -SW              Exercise 5    Exercise Name 5 functional training initiation  -SW      Additional Comments discussed proper sit to stand positions with PF engagement.  sit to stands and supine to sit transfers with PF engagement.  Good knowledge and demo with verbalized understanding.  Pt is very aware of proper body mechanics with positioning.  Educated on weight training with weights, engage PF with effort.  -            User Key  (r) = Recorded By, (t) = Taken By, (c) = Cosigned By    Initials Name Provider Type    Alma Kaba, PT DPT Physical Therapist              Manual Rx (last 36 hours)     Manual Treatments     Row Name 03/02/22 1100             Manual Rx 1    Manual Rx 1 Location abdominal fascial release  -SW              Manual Rx 2    Manual Rx 2 Location bladder mobilizations  -SW              Manual Rx 3    Manual Rx 3 Location scar mobilization midline abdominal wall.  -            User Key  (r) = Recorded By, (t) = Taken By, (c) = Cosigned By    Initials Name Provider Type    Alma Kaba, PT DPT Physical Therapist                           PT OP Goals     Row Name 03/02/22 1200          PT Short Term Goals    STG 1 patient to complete bladder diary x 3 days and return to clinic at next visit.  -SW     STG 1 Progress Met  -SW     STG 2 patient to verbalize dietary irritants and reduce/eliminate  those from intake as to dec urgency of bladder.  -     STG 2 Progress Met  -     STG 3 patient to demo toileting position as to improve elimination of urine with effectiveness vs required straining or double void for clearance.  -     STG 3 Progress Met  -     STG 4 patient to consume appropriate hydration amounts of 2/3 good hydration and 1/3 irritant or less to dec urgency to bladder.  -     STG 4 Progress Met  -     STG 5 patient to begin bladder retraining at intervals of every 2 hours with gradual increments of increase as tolerant with controlled levels of urgency.  -[facility] 5 Progress Met  -            Long Term Goals    LTG Date to Achieve 06/17/22  -     LTG 1 patient to be able to demo seated and standing SEMG readings with ability to contract timely and at amplitudes sufficient to support bladder during activity with good breath support and control  -     LTG 1 Progress Met  -     LTG 2 paitent to report UI improved by 70-80% overall  -     LTG 2 Progress Progressing  -     LTG 3 PFDI SF 20 improved to 50 or less showing improved function and control.  -     LTG 3 Progress Progressing  -     LTG 4 Pt to have improved fascial mobiltiy around bladder to dec urgency due to inc abdominal tension  -     LTG 4 Progress Progressing  -            Time Calculation    PT Goal Re-Cert Due Date 03/31/22  -           User Key  (r) = Recorded By, (t) = Taken By, (c) = Cosigned By    Initials Name Provider Type    Alma Kaba, PT DPT Physical Therapist                 Therapy Education  Given: HEP, Symptoms/condition management, Posture/body mechanics  Program: Reinforced, Progressed  How Provided: Verbal, Demonstration, Written (sent via email as printer ink was too low for reading.)  Provided to: Patient  Level of Understanding: Teach back education performed, Verbalized, Demonstrated              Time Calculation:   Start Time: 1120  Stop Time: 1215  Time Calculation  (min): 55 min  Therapy Charges for Today     Code Description Service Date Service Provider Modifiers Qty    65258648159 HC PT MANUAL THERAPY EA 15 MIN 3/2/2022 Alma Ortiz, PT DPT GP 1    56426631553 HC PT THERAPEUTIC ACT EA 15 MIN 3/2/2022 Alma Ortiz, PT DPT GP 1    [id number]  PT THER PROC EA 15 MIN 3/2/2022 Alma Ortiz, PT DPT GP 2                    Alma Ortiz, PT DPT  3/2/2022

## 2022-03-11 RX ORDER — CYCLOBENZAPRINE HCL 10 MG
10 TABLET ORAL 3 TIMES DAILY PRN
Qty: 270 TABLET | Refills: 3 | Status: SHIPPED | OUTPATIENT
Start: 2022-03-11

## 2022-03-11 NOTE — TELEPHONE ENCOUNTER
Rx Refill Note  Requested Prescriptions     Pending Prescriptions Disp Refills   • cyclobenzaprine (FLEXERIL) 10 MG tablet 90 tablet 3     Sig: Take 1 tablet by mouth 3 (Three) Times a Day As Needed for Muscle Spasms.      Last office visit with prescribing clinician: 7/23/2021      Next office visit with prescribing clinician: Visit date not found            Mago Bal MA  03/11/22, 09:15 CST

## 2022-03-23 ENCOUNTER — HOSPITAL ENCOUNTER (OUTPATIENT)
Dept: PHYSICAL THERAPY | Facility: HOSPITAL | Age: 62
Setting detail: THERAPIES SERIES
Discharge: HOME | End: 2022-03-23

## 2022-03-23 DIAGNOSIS — N39.3 SUI (STRESS URINARY INCONTINENCE, FEMALE): Primary | ICD-10-CM

## 2022-03-23 PROCEDURE — 97535 SELF CARE MNGMENT TRAINING: CPT | Performed by: PHYSICAL THERAPIST

## 2022-03-23 PROCEDURE — 97110 THERAPEUTIC EXERCISES: CPT | Performed by: PHYSICAL THERAPIST

## 2022-03-23 NOTE — THERAPY DISCHARGE NOTE
Outpatient Physical Therapy Pelvic Health Treatment Note/Discharge Summary  [facility]     Patient Name: Ronit Robbins  : 1960  MRN: [id number]  Today's Date: 3/23/2022        Visit Date: 2022   Visit Number:   Recheck: NA due to DC this date  Insurance: [facility]    Visit Dx:    ICD-10-CM ICD-9-CM   1. [facility] (stress urinary incontinence, female)  N39.3 625.6       Patient Active Problem List   Diagnosis   • Idiopathic scoliosis   • Essential hypertension   • Mixed hyperlipidemia   • Postmenopausal atrophic vaginitis         Pelvic Health     Row Name 22 1000             Pregnancy Questions    Number of Pregnancies 1  -SW      Number of Children 1  -SW              Pelvic Floor Muscle    Endurance (Ability to Hold Maximal Contraction) 10 sec  -SW      # of Reps of Maximal Contractions while Maintaining Endurance and Strength 10 sets  -SW      Fast Contraction (# of 1 sec contractions performed) 10  -SW      External Pelvic Floor Comments per subjective report, tissues moving better with less adherence noted. Good recognition of breath support with completion of training activities even in more dynamic positions.  -SW              Observations    Posture Observations Posturally aware in all positions.  Good activation of core muscles with accuracy.  -SW              Pelvic Floor    Ability to Isolate Contraction of Pelvic Floor Yes  -SW              SEMG Evaluation    Pelvic Floor Electrode Internal Vaginal  -SW      Baseline Resting Yes  -SW      SEMG Evaluation Comments patient with ability to show activation of PF in standing position with correct recruitment and use of breath support.  She has normal resting baselines.   Isolated activation of PF with good endurance at amplitudes of 20+mv with full return to baselines.  -SW              Education Provided On:    Education Points HEP;Behavioral modifications  -SW      Method of Delivery Verbal;Demonstration;Written  -SW       Education Provided To Patient  -SW      Level of Understanding Teach back education performed;Verbalized;Demonstrated  -SW      HEP Comments cont with uptraining PF in standing and dynamic positions.  Goal 60-80 total contractions per day. Use of vaginal weight if desires for advancement.  -              Outcome Measures    Outcome Measure Options PFDI-20  score 52.15  -SW              Pelvic Organ Prolapse Distress Inventory 6 (POPDI-6)    Do you usually experience pressure in the lower abdomen? 1  -SW      Do you usually experience heaviness or dullness in the pelvic area 1  -SW      Do you usually have a bulge or something falling out that you can see or feel in your vaginal area? 0  -SW      Do you ever have to push on the vagina or around the rectum to have or complete a bowel movement? 0  -SW      Do you usually experience a feeling of incomplete bladder emptying? 0  -SW      Do you ever have to push up on a bulge in the vaginal area with your fingers to start or complete urination? 0  -SW      POPDI-6 Score 0.33  -SW              Colorectal-Anal Distress Inventory 8 (CRAD-8)    Do you feel you need to strain too hard to have a bowel movement? 2  -SW      Do you feel you have not completely emptied your bowels at the end of a bowel movement? 2  -SW      Do you usually lose stool beyond your control if your stool is well formed? 0  -SW      Do you usually lose stool beyond your control if your stool is loose? 2  -SW      Do you usually lose gas from your rectum beyond your control? 2  -SW      Do you usually have pain when you pass your stool? 0  -SW      Do you experience a strong sense of urgency and have to rush to the bathroom to have a bowel movement 2  -SW      Does part of your bowel ever pass through the rectum and bulge outside during or after a bowel movement? 0  -SW      CRAD-8 Score 1.25  -SW              Urinary Distress Inventory 6 (FAISAL-6)    Do you usually experience frequent urination? 0  -SW       Do you usually experience urine leakage associated with a feeling of urgency, that is, a strong sensation of needing to go to the bathroom? 2  -SW      Do you usually experience urine leakage related to coughing, sneezing, or laughing? 0  -SW      Do you usually experience small amounts of urine leakage(that is, drops)? 2  -SW      Do you usually experience difficulty emptying your bladder? 0  -SW      Do you usually experience pain or discomfort in the lower abdomen or genital region? 0  -SW      FAISAL-6 Score 0.67  -SW              General ROM    GENERAL ROM COMMENTS WFL throughout  -SW              MMT (Manual Muscle Testing)    General MMT Comments functional 5/5 throughout  -SW            User Key  (r) = Recorded By, (t) = Taken By, (c) = Cosigned By    Initials Name Provider Type    Alma Kaba, PT DPT Physical Therapist               PT Ortho     Row Name 03/23/22 1000       Subjective Comments    Subjective Comments Not sure if I have advanced as much as I did the first time.  I still have to think about it when standing.  I have been successful with standing endurance exercise while I am working on [employer].  Last 3 weeks has had 1 accident. Had carbonated beverage (larger amount than usual) and was standing at [facility].  Able to stand with PF contraction and postural position for urge suppression but noted some leakage present.  Made it to bathroom prior to complete loss of urine that she reports would have been the case prior to training.  I have been working on [employer] as well.  I feel some better with mobiltiy.  Void schedule 3.5 hours.  Holding bladder better at night. Continues to watch irritants and overall hydration. Pt desires this to be her last visit.  Believes she knows what to do and has been with good compliance for program.  -SW       Subjective Pain    Able to rate subjective pain? yes  -SW    Pre-Treatment Pain Level 0  -SW    Post-Treatment Pain Level 0  -SW          User Key   (r) = Recorded By, (t) = Taken By, (c) = Cosigned By    Initials Name Provider Type    Alma Kaba, PT DPT Physical Therapist                            PT Assessment/Plan     Row Name 03/23/22 1000          PT Assessment    Assessment Comments Patient has been very compliant with PF program. She understands concepts associated with training and has implemented change to normal activity and routine.  She desires to transition to I management at this time.  Understands progression and need for continued program.  All goals met except PFDI score but feel strongly those numbers will improve with continued training on her own.  -SW     Rehab Potential Good  -     Patient/caregiver participated in establishment of treatment plan and goals Yes  -SW     Patient would benefit from skilled therapy intervention Yes  -SW            PT Plan    PT Frequency --  DC this date per pt request  -     PT Plan Comments DC today per patient request.  Consult with any questions/concerns.  Cont with daily training as prescribed.  -           User Key  (r) = Recorded By, (t) = Taken By, (c) = Cosigned By    Initials Name Provider Type    Alma Kaba, PT DPT Physical Therapist                     OP Exercises     Row Name 03/23/22 1000             Subjective Comments    Subjective Comments Not sure if I have advanced as much as I did the first time.  I still have to think about it when standing.  I have been successful with standing endurance exercise while I am working on [employer].  Last 3 weeks has had 1 accident. Had carbonated beverage (larger amount than usual) and was standing at [facility].  Able to stand with PF contraction and postural position for urge suppression but noted some leakage present.  Made it to bathroom prior to complete loss of urine that she reports would have been the case prior to training.  I have been working on Abdomen as well.  I feel some better with mobiltiy.  Void schedule 3.5  hours.  Holding bladder better at night. Continues to watch irritants and overall hydration. Pt desires this to be her last visit.  Believes she knows what to do and has been with good compliance for program.  -              Subjective Pain    Able to rate subjective pain? yes  -      Pre-Treatment Pain Level 0  -      Post-Treatment Pain Level 0  -              Exercise 1    Exercise Name 1 behavioral management review  -      Additional Comments voids 3.5 hours; dietary irritants reduced with good hydration of fluids. QF post void. Incorporating PF activity with ADL and exercise routine.  -              Exercise 2    Exercise Name 2 DC Planning for PF  -SW      Additional Comments encouraged pt to shoot for 60-80 contractions per day (including 5 post void QF, followed by remainder of endurance contractions at least 10 sec in duration).  Once completed dry x 30 days, can transition to 3x/week for maintanence.  Incorporate training with ADL functions and with exercise training.  -              Exercise 3    Exercise Name 3 Advanced training  -      Additional Comments educated patient on use of PF sensor or purchase of intravaginal weights for assist to inc tone to PF.  Vaginal weights shown to patient with application of wear schedule and proper use for future training if desired.  -              Exercise 4    Exercise Name 4 SEMG standing  -      Additional Comments patient able to demonstrate normal baselines measures with standing position.  PF resting tone 1.5-2.9 mv average.  Able to contract with isolated recruitment and release with full return to baselines.  Amplitudes 20+ mv.  Endurance x 10 sec with full return to baselines.  Pt able to also utilize proper breath support with recruitment.  -              Exercise 5    Exercise Name 5 ploymetric instruction for PF  -      Additional Comments pt does not participate with running or jumping activities as normal part of routine work  out.  However, illustrated with instruction to bring rib cage down (dec tension to fascia) and impact on ball of feet lightly vs foot flat with inc impact.  Pt verbalized understanding.  -            User Key  (r) = Recorded By, (t) = Taken By, (c) = Cosigned By    Initials Name Provider Type    Alma Kaba, PT DPT Physical Therapist                             PT OP Goals     Row Name 03/23/22 1000          PT Short Term Goals    STG 1 patient to complete bladder diary x 3 days and return to clinic at next visit.  -     STG 1 Progress Met  -     STG 2 patient to verbalize dietary irritants and reduce/eliminate those from intake as to dec urgency of bladder.  -     STG 2 Progress Met  -     STG 3 patient to demo toileting position as to improve elimination of urine with effectiveness vs required straining or double void for clearance.  -     STG 3 Progress Met  -     STG 4 patient to consume appropriate hydration amounts of 2/3 good hydration and 1/3 irritant or less to dec urgency to bladder.  -     STG 4 Progress Met  -     STG 5 patient to begin bladder retraining at intervals of every 2 hours with gradual increments of increase as tolerant with controlled levels of urgency.  -     STG 5 Progress Met  -            Long Term Goals    LTG Date to Achieve --  -     LTG 1 patient to be able to demo seated and standing SEMG readings with ability to contract timely and at amplitudes sufficient to support bladder during activity with good breath support and control  -     LTG 1 Progress Met  -     LTG 2 paitent to report UI improved by 70-80% overall  -     LTG 2 Progress Met  -     LTG 3 PFDI SF 20 improved to 50 or less showing improved function and control.  -     LTG 3 Progress Partially Met  -     LTG 3 Progress Comments score 52.15 this date.  Good progression.  Expect score to continue to lower with advanced progression with independent program.  -     LTG 4 Pt to  have improved fascial mobiltiy around bladder to dec urgency due to inc abdominal tension  -SW     LTG 4 Progress Met  -            Time Calculation    PT Goal Re-Cert Due Date --  DC this date  -           User Key  (r) = Recorded By, (t) = Taken By, (c) = Cosigned By    Initials Name Provider Type    Alma Kaba PT DPT Physical Therapist                 Therapy Education  Given: HEP, Symptoms/condition management, Posture/body mechanics  Program: Reinforced, Progressed  How Provided: Verbal, Demonstration  Provided to: Patient  Level of Understanding: Teach back education performed, Verbalized, Demonstrated            Time Calculation:   Start Time: 1015  Stop Time: 1102  Time Calculation (min): 47 min    Therapy Charges for Today     Code Description Service Date Service Provider Modifiers Qty    79861653687 HC PT THER PROC EA 15 MIN 3/23/2022 Alma Ortiz PT DPT GP 1    97883393444 HC PT SELF CARE/MGMT/TRAIN EA 15 MIN 3/23/2022 Alma Ortiz PT DPT GP 2               OP PT Discharge Summary  Date of Discharge: 03/23/22  Reason for Discharge: All goals achieved  Outcomes Achieved: Able to achieve all goals within established timeline  Discharge Destination: Home with home program  Discharge Instructions/Additional Comments: cont with HEP and advancement as advised.  Reach out with questions as they arise.      Alma Ortiz PT DPT  3/23/2022

## 2022-04-22 RX ORDER — OMEPRAZOLE 20 MG/1
20 CAPSULE, DELAYED RELEASE ORAL DAILY
Qty: 90 CAPSULE | Refills: 0 | Status: SHIPPED | OUTPATIENT
Start: 2022-04-22 | End: 2022-07-18 | Stop reason: SDUPTHER

## 2022-04-22 NOTE — TELEPHONE ENCOUNTER
Rx Refill Note  Requested Prescriptions     Pending Prescriptions Disp Refills   • omeprazole (priLOSEC) 20 MG capsule 90 capsule 3     Sig: Take 1 capsule by mouth Daily. Pt request meds be mailed      Last office visit with prescribing clinician: 7/23/2021      Next office visit with prescribing clinician: Visit date not found   CPE done 07/2021    {TIP  Please add Last Relevant Lab Date if appropriate: 07/01/2021  {TIP  Is Refill Pharmacy correct?: yes    Mago Geiger MA  04/22/22, 10:58 CDT

## 2022-07-18 RX ORDER — OMEPRAZOLE 20 MG/1
20 CAPSULE, DELAYED RELEASE ORAL DAILY
Qty: 90 CAPSULE | Refills: 0 | Status: SHIPPED | OUTPATIENT
Start: 2022-07-18 | End: 2022-10-16 | Stop reason: SDUPTHER

## 2022-07-18 NOTE — TELEPHONE ENCOUNTER
Rx Refill Note  Requested Prescriptions     Pending Prescriptions Disp Refills   • omeprazole (priLOSEC) 20 MG capsule 90 capsule 0     Sig: Take 1 capsule by mouth Daily.      Last office visit with prescribing clinician: 7/23/21 Per Dr. Awad  Next office visit with prescribing clinician: Visit date not found         Mitali Mixon MA  07/18/22, 07:45 CDT

## 2022-07-29 ENCOUNTER — OFFICE VISIT (OUTPATIENT)
Dept: FAMILY MEDICINE CLINIC | Facility: CLINIC | Age: 62
End: 2022-07-29

## 2022-07-29 VITALS
WEIGHT: 152 LBS | RESPIRATION RATE: 16 BRPM | BODY MASS INDEX: 26.93 KG/M2 | DIASTOLIC BLOOD PRESSURE: 68 MMHG | HEART RATE: 93 BPM | OXYGEN SATURATION: 99 % | HEIGHT: 63 IN | TEMPERATURE: 97.8 F | SYSTOLIC BLOOD PRESSURE: 114 MMHG

## 2022-07-29 DIAGNOSIS — Z12.11 SCREENING FOR COLORECTAL CANCER: ICD-10-CM

## 2022-07-29 DIAGNOSIS — E78.2 MIXED HYPERLIPIDEMIA: ICD-10-CM

## 2022-07-29 DIAGNOSIS — Z78.0 POST-MENOPAUSAL: ICD-10-CM

## 2022-07-29 DIAGNOSIS — Z12.12 SCREENING FOR COLORECTAL CANCER: ICD-10-CM

## 2022-07-29 DIAGNOSIS — M54.9 BACK PAIN, UNSPECIFIED BACK LOCATION, UNSPECIFIED BACK PAIN LATERALITY, UNSPECIFIED CHRONICITY: Primary | ICD-10-CM

## 2022-07-29 PROCEDURE — 81003 URINALYSIS AUTO W/O SCOPE: CPT | Performed by: FAMILY MEDICINE

## 2022-07-29 PROCEDURE — 99396 PREV VISIT EST AGE 40-64: CPT | Performed by: FAMILY MEDICINE

## 2022-07-29 NOTE — PROGRESS NOTES
"Ronit Robbins    1960    Chief Complaint   Patient presents with   • Annual Exam     Fasting       Vitals:    07/29/22 1048   BP: 114/68   BP Location: Left arm   Patient Position: Sitting   Cuff Size: Adult   Pulse: 93   Resp: 16   Temp: 97.8 °F (36.6 °C)   TempSrc: Temporal   SpO2: 99%   Weight: 68.9 kg (152 lb)   Height: 160 cm (63\")   PainSc:   3   PainLoc: Generalized       62-year-old female with history of hypertension and scoliosis, and wellness exam      Review of Systems   Respiratory: Negative for shortness of breath.    Cardiovascular: Negative for chest pain and leg swelling.   Gastrointestinal:        Colonoscopy 2017   Genitourinary:        Atrophic vaginitis   Musculoskeletal: Positive for arthralgias and back pain.   All other systems reviewed and are negative.      Past Medical History:   Diagnosis Date   • Hyperlipidemia    • Hypertension    • Scoliosis          Current Outpatient Medications:   •  Bepotastine Besilate 1.5 % solution, Administer 1 drop to both eyes 2 (Two) Times a Day., Disp: 10 mL, Rfl: 6  •  calcipotriene-betamethasone (TACLONEX SCALP) 0.005-0.064 % external suspension, Apply to scalp twice weekly as directed for psoriasis., Disp: 60 g, Rfl: 1  •  cholecalciferol (VITAMIN D3) 1000 UNITS tablet, Take 1,000 Units by mouth Daily., Disp: , Rfl:   •  cyclobenzaprine (FLEXERIL) 10 MG tablet, Take 1 tablet by mouth 3 (Three) Times a Day As Needed for Muscle Spasms., Disp: 270 tablet, Rfl: 3  •  estradiol (VAGIFEM) 10 MCG tablet vaginal tablet, Insert 1 tablet into the vagina 3 (Three) Times a Week., Disp: 36 tablet, Rfl: 3  •  indapamide (LOZOL) 1.25 MG tablet, Take 1 tablet by mouth Every Morning., Disp: 90 tablet, Rfl: 3  •  lisinopril (PRINIVIL,ZESTRIL) 10 MG tablet, Take 1 tablet by mouth 2 (two) times a day., Disp: 180 tablet, Rfl: 3  •  meclizine (ANTIVERT) 25 MG tablet, Take 1 tablet by mouth 3 (Three) Times a Day As Needed for Dizziness., Disp: 90 tablet, Rfl: 3  •  " metFORMIN (GLUCOPHAGE) 500 MG tablet, Take 1 tablet by mouth 2 (Two) Times a Day With Meals., Disp: 180 tablet, Rfl: 3  •  omeprazole (priLOSEC) 20 MG capsule, Take 1 capsule by mouth Daily., Disp: 90 capsule, Rfl: 0  •  ondansetron (ZOFRAN) 4 MG tablet, Take 1 tablet by mouth Every 8 (Eight) Hours As Needed for Nausea or Vomiting., Disp: 10 tablet, Rfl: 2  •  pravastatin (PRAVACHOL) 20 MG tablet, Take 1 tablet by mouth Every Night., Disp: 90 tablet, Rfl: 3  •  Stannous Fluoride (Gel-Justo) 0.4 % gel, Take as directed, Disp: 244 g, Rfl: 5    No Known Allergies    Patient Active Problem List   Diagnosis   • Idiopathic scoliosis   • Essential hypertension   • Mixed hyperlipidemia   • Postmenopausal atrophic vaginitis       Social History     Socioeconomic History   • Marital status:    Tobacco Use   • Smoking status: Never Smoker   • Smokeless tobacco: Never Used   Vaping Use   • Vaping Use: Never used   Substance and Sexual Activity   • Alcohol use: Yes     Comment: occ   • Drug use: No   • Sexual activity: Yes     Partners: Male     Birth control/protection: Post-menopausal       Past Surgical History:   Procedure Laterality Date   • HAND SURGERY     • HYSTERECTOMY     • SPINAL FUSION     • VEIN SURGERY         Physical Exam  Vitals and nursing note reviewed.   Constitutional:       Appearance: Normal appearance.   HENT:      Right Ear: Tympanic membrane and ear canal normal.      Left Ear: Tympanic membrane and ear canal normal.   Eyes:      Extraocular Movements: Extraocular movements intact.      Pupils: Pupils are equal, round, and reactive to light.   Neck:      Vascular: No carotid bruit.   Cardiovascular:      Rate and Rhythm: Normal rate and regular rhythm.      Pulses: Normal pulses.      Heart sounds: Normal heart sounds.   Pulmonary:      Effort: Pulmonary effort is normal.      Breath sounds: Normal breath sounds.      Comments: Breast no masses noted  Abdominal:      General: Abdomen is flat.       "Palpations: Abdomen is soft. There is no mass.   Genitourinary:     Comments: Surgical past  Musculoskeletal:      Right lower leg: No edema.      Left lower leg: No edema.   Lymphadenopathy:      Cervical: No cervical adenopathy.   Skin:     General: Skin is warm and dry.   Neurological:      General: No focal deficit present.      Mental Status: She is alert and oriented to person, place, and time.   Psychiatric:         Mood and Affect: Mood normal.         Behavior: Behavior normal.         Thought Content: Thought content normal.         Vitals:    07/29/22 1048   BP: 114/68   BP Location: Left arm   Patient Position: Sitting   Cuff Size: Adult   Pulse: 93   Resp: 16   Temp: 97.8 °F (36.6 °C)   TempSrc: Temporal   SpO2: 99%   Weight: 68.9 kg (152 lb)   Height: 160 cm (63\")     BMI is >= 25 and <30. (Overweight) The following options were offered after discussion;: weight loss educational material (shared in after visit summary)      Diagnoses and all orders for this visit:    1. Back pain, unspecified back location, unspecified back pain laterality, unspecified chronicity (Primary)  -     POC Urinalysis Dipstick, Multipro    2. Screening for colorectal cancer  -     Cologuard - Stool, Per Rectum; Future  -     Mammo Screening Digital Tomosynthesis Bilateral With CAD; Future    3. Post-menopausal  -     DEXA Bone Density Axial; Future    4. Mixed hyperlipidemia        Problems Addressed this Visit        Cardiac and Vasculature    Mixed hyperlipidemia      Other Visit Diagnoses     Back pain, unspecified back location, unspecified back pain laterality, unspecified chronicity    -  Primary    Relevant Orders    POC Urinalysis Dipstick, Multipro (Completed)    Screening for colorectal cancer        Relevant Orders    Cologuard - Stool, Per Rectum    Mammo Screening Digital Tomosynthesis Bilateral With CAD    Post-menopausal        Relevant Orders    DEXA Bone Density Axial      Diagnoses       Codes Comments    " Back pain, unspecified back location, unspecified back pain laterality, unspecified chronicity    -  Primary ICD-10-CM: M54.9  ICD-9-CM: 724.5     Screening for colorectal cancer     ICD-10-CM: Z12.11, Z12.12  ICD-9-CM: V76.51, V76.41     Post-menopausal     ICD-10-CM: Z78.0  ICD-9-CM: V49.81     Mixed hyperlipidemia     ICD-10-CM: E78.2  ICD-9-CM: 272.2           Health Maintenance:  Immunization History   Administered Date(s) Administered   • COVID-19 (PFIZER) PURPLE CAP 01/26/2021, 02/16/2021, 11/15/2021   • FluLaval/Fluarix/Fluzone >6 10/13/2020, 10/07/2021   • Influenza, Unspecified 10/09/2018   • Pneumococcal Polysaccharide (PPSV23) 02/05/2013   • Shingrix 11/23/2019, 08/28/2020   • Tdap 05/04/2018              Plan-above-get second COVID booster-Mediterranean diet exercise advised for hyperlipidemia-- preventive measures because of high risk important i.e. physical therapy                    Electronically signed by Dmitriy Awad MD 07/29/2022

## 2022-08-15 RX ORDER — PRAVASTATIN SODIUM 20 MG
20 TABLET ORAL NIGHTLY
Qty: 90 TABLET | Refills: 3 | Status: SHIPPED | OUTPATIENT
Start: 2022-08-15

## 2022-08-15 NOTE — TELEPHONE ENCOUNTER
Rx Refill Note  Requested Prescriptions     Pending Prescriptions Disp Refills   • pravastatin (PRAVACHOL) 20 MG tablet 90 tablet 3     Sig: Take 1 tablet by mouth Every Night.      Last office visit with prescribing clinician: 7/29/2022      Next office visit with prescribing clinician: Visit date not found       {TIP  Please add Last Relevant Lab 7/26/22    Mago Bal MA  08/15/22, 07:39 CDT

## 2022-08-16 ENCOUNTER — IMMUNIZATION (OUTPATIENT)
Dept: FAMILY MEDICINE CLINIC | Facility: CLINIC | Age: 62
End: 2022-08-16

## 2022-08-16 DIAGNOSIS — Z23 NEED FOR COVID-19 VACCINE: Primary | ICD-10-CM

## 2022-08-16 PROCEDURE — 91305 COVID-19 (PFIZER) 12+ YRS: CPT | Performed by: FAMILY MEDICINE

## 2022-08-16 PROCEDURE — 0054A COVID-19 (PFIZER) 12+ YRS: CPT | Performed by: FAMILY MEDICINE

## 2022-08-29 ENCOUNTER — OFFICE VISIT (OUTPATIENT)
Dept: FAMILY MEDICINE CLINIC | Facility: CLINIC | Age: 62
End: 2022-08-29

## 2022-08-29 VITALS
RESPIRATION RATE: 18 BRPM | TEMPERATURE: 98.4 F | HEIGHT: 63 IN | DIASTOLIC BLOOD PRESSURE: 70 MMHG | HEART RATE: 90 BPM | SYSTOLIC BLOOD PRESSURE: 124 MMHG | OXYGEN SATURATION: 98 % | BODY MASS INDEX: 27.18 KG/M2 | WEIGHT: 153.4 LBS

## 2022-08-29 DIAGNOSIS — M25.552 LEFT HIP PAIN: Primary | ICD-10-CM

## 2022-08-29 PROCEDURE — 99212 OFFICE O/P EST SF 10 MIN: CPT | Performed by: FAMILY MEDICINE

## 2022-08-29 NOTE — PROGRESS NOTES
"Ronit Robbins    1960    Chief Complaint   Patient presents with   • Pain     Left sided Lumbar/Hip pain       Vitals:    08/29/22 1405   BP: 124/70   BP Location: Left arm   Patient Position: Sitting   Cuff Size: Adult   Pulse: 90   Resp: 18   Temp: 98.4 °F (36.9 °C)   TempSrc: Temporal   SpO2: 98%   Weight: 69.6 kg (153 lb 6.4 oz)   Height: 160 cm (63\")   PainSc:   6       62-year-old with pain in the left groin area      Review of Systems   Musculoskeletal: Positive for arthralgias and back pain.        Remote back fusion T12-L4 with lateral listhesis--left groin pain aggravated by sitting       Past Medical History:   Diagnosis Date   • Hyperlipidemia    • Hypertension    • Scoliosis          Current Outpatient Medications:   •  Bepotastine Besilate 1.5 % solution, Administer 1 drop to both eyes 2 (Two) Times a Day., Disp: 10 mL, Rfl: 6  •  calcipotriene-betamethasone (TACLONEX SCALP) 0.005-0.064 % external suspension, Apply to scalp twice weekly as directed for psoriasis., Disp: 60 g, Rfl: 1  •  cholecalciferol (VITAMIN D3) 1000 UNITS tablet, Take 1,000 Units by mouth Daily., Disp: , Rfl:   •  cyclobenzaprine (FLEXERIL) 10 MG tablet, Take 1 tablet by mouth 3 (Three) Times a Day As Needed for Muscle Spasms., Disp: 270 tablet, Rfl: 3  •  estradiol (VAGIFEM) 10 MCG tablet vaginal tablet, Insert 1 tablet into the vagina 3 (Three) Times a Week., Disp: 36 tablet, Rfl: 3  •  metFORMIN (GLUCOPHAGE) 500 MG tablet, Take 1 tablet by mouth 2 (Two) Times a Day With Meals. (Patient taking differently: Take 500 mg by mouth Daily With Breakfast.), Disp: 180 tablet, Rfl: 3  •  omeprazole (priLOSEC) 20 MG capsule, Take 1 capsule by mouth Daily., Disp: 90 capsule, Rfl: 0  •  ondansetron (ZOFRAN) 4 MG tablet, Take 1 tablet by mouth Every 8 (Eight) Hours As Needed for Nausea or Vomiting., Disp: 10 tablet, Rfl: 2  •  pravastatin (PRAVACHOL) 20 MG tablet, Take 1 tablet by mouth Every Night., Disp: 90 tablet, Rfl: 3  •  Stannous " Fluoride (Gel-Justo) 0.4 % gel, Take as directed, Disp: 244 g, Rfl: 5  •  indapamide (LOZOL) 1.25 MG tablet, Take 1 tablet by mouth Every Morning., Disp: 90 tablet, Rfl: 3  •  lisinopril (PRINIVIL,ZESTRIL) 10 MG tablet, Take 1 tablet by mouth 2 (Two) Times a Day., Disp: 180 tablet, Rfl: 3  •  meclizine (ANTIVERT) 25 MG tablet, Take 1 tablet by mouth 3 (Three) Times a Day As Needed for Dizziness., Disp: 90 tablet, Rfl: 3  •  predniSONE (DELTASONE) 10 MG (21) dose pack, Take as directed on package with food., Disp: 21 tablet, Rfl: 0    No Known Allergies    Patient Active Problem List   Diagnosis   • Idiopathic scoliosis   • Essential hypertension   • Mixed hyperlipidemia   • Postmenopausal atrophic vaginitis       Social History     Socioeconomic History   • Marital status:    Tobacco Use   • Smoking status: Never Smoker   • Smokeless tobacco: Never Used   Vaping Use   • Vaping Use: Never used   Substance and Sexual Activity   • Alcohol use: Yes     Comment: occ   • Drug use: No   • Sexual activity: Yes     Partners: Male     Birth control/protection: Post-menopausal       Past Surgical History:   Procedure Laterality Date   • HAND SURGERY     • HYSTERECTOMY     • SPINAL FUSION     • VEIN SURGERY         Physical Exam  Vitals and nursing note reviewed.   Constitutional:       Appearance: Normal appearance.   Pulmonary:      Effort: Pulmonary effort is normal.   Musculoskeletal:         General: Tenderness present.      Comments: Left hip tenderness   Skin:     General: Skin is warm and dry.   Neurological:      General: No focal deficit present.      Mental Status: She is alert and oriented to person, place, and time.   Psychiatric:         Mood and Affect: Mood normal.         Behavior: Behavior normal.         Thought Content: Thought content normal.         Judgment: Judgment normal.         Vitals:    08/29/22 1405   BP: 124/70   BP Location: Left arm   Patient Position: Sitting   Cuff Size: Adult   Pulse:  "90   Resp: 18   Temp: 98.4 °F (36.9 °C)   TempSrc: Temporal   SpO2: 98%   Weight: 69.6 kg (153 lb 6.4 oz)   Height: 160 cm (63\")     BMI is >= 25 and <30. (Overweight) The following options were offered after discussion;: exercise counseling/recommendations      Diagnoses and all orders for this visit:    1. Left hip pain (Primary)  -     XR Hip With or Without Pelvis 4 View Left; Future  -     XR Hip With or Without Pelvis 4 View Left        Problems Addressed this Visit    None     Visit Diagnoses     Left hip pain    -  Primary    Relevant Orders    XR Hip With or Without Pelvis 4 View Left (Completed)      Diagnoses       Codes Comments    Left hip pain    -  Primary ICD-10-CM: M25.552  ICD-9-CM: 719.45           Health Maintenance:  Immunization History   Administered Date(s) Administered   • COVID-19 (PFIZER) PURPLE CAP 01/26/2021, 02/16/2021, 11/15/2021   • Covid-19 (Pfizer) Gray Cap 08/16/2022   • FluLaval/Fluzone >6mos 10/13/2020, 10/07/2021   • Influenza, Unspecified 10/09/2018   • Pneumococcal Polysaccharide (PPSV23) 02/05/2013   • Shingrix 11/23/2019, 08/28/2020   • Tdap 05/04/2018            Plan above--if plain x-rays with hips are unremarkable-she needs an MRI of her left hip to rule out labial or cartilaginous injury                      Electronically signed by Dmitriy Awad MD 08/29/2022  "

## 2022-08-30 RX ORDER — PREDNISONE 10 MG/1
TABLET ORAL
Qty: 21 TABLET | Refills: 0 | Status: SHIPPED | OUTPATIENT
Start: 2022-08-30

## 2022-09-01 ENCOUNTER — PATIENT ROUNDING (BHMG ONLY) (OUTPATIENT)
Dept: FAMILY MEDICINE CLINIC | Facility: CLINIC | Age: 62
End: 2022-09-01

## 2022-09-01 NOTE — PROGRESS NOTES
September 1, 2022    Hello, may I speak with Ronit Robbins?    My name is Roxanne    I am  with [facility]  [address], [address]  [place] [address] [phone].    Before we get started may I verify your date of birth? 1960    I am calling to officially welcome you to our practice and ask about your recent visit. Is this a good time to talk? Yes    Tell me about your visit with us. What things went well?  Everything went Well!       We're always looking for ways to make our patients' experiences even better. Do you have recommendations on ways we may improve?  No    Overall were you satisfied with your first visit to our practice? Yes       I appreciate you taking the time to speak with me today. Is there anything else I can do for you? No      Thank you, and have a great day.

## 2022-09-12 ENCOUNTER — TELEPHONE (OUTPATIENT)
Dept: FAMILY MEDICINE CLINIC | Facility: CLINIC | Age: 62
End: 2022-09-12

## 2022-09-12 RX ORDER — MECLIZINE HYDROCHLORIDE 25 MG/1
25 TABLET ORAL 3 TIMES DAILY PRN
Qty: 90 TABLET | Refills: 3 | Status: SHIPPED | OUTPATIENT
Start: 2022-09-12

## 2022-09-12 NOTE — TELEPHONE ENCOUNTER
Rx Refill Note  Requested Prescriptions     Pending Prescriptions Disp Refills   • meclizine (ANTIVERT) 25 MG tablet 90 tablet 3     Sig: Take 1 tablet by mouth 3 (Three) Times a Day As Needed for Dizziness.      Last office visit with prescribing clinician: 7/29/22    Next office visit with prescribing clinician: Visit date not found            Mago Bal MA  09/12/22, 08:34 CDT

## 2022-09-19 RX ORDER — LISINOPRIL 10 MG/1
10 TABLET ORAL 2 TIMES DAILY
Qty: 180 TABLET | Refills: 3 | Status: SHIPPED | OUTPATIENT
Start: 2022-09-19

## 2022-09-19 RX ORDER — INDAPAMIDE 1.25 MG/1
1.25 TABLET, FILM COATED ORAL EVERY MORNING
Qty: 90 TABLET | Refills: 3 | Status: SHIPPED | OUTPATIENT
Start: 2022-09-19

## 2022-09-19 NOTE — TELEPHONE ENCOUNTER
Rx Refill Note  Requested Prescriptions     Pending Prescriptions Disp Refills   • indapamide (LOZOL) 1.25 MG tablet 90 tablet 3     Sig: Take 1 tablet by mouth Every Morning.   • lisinopril (PRINIVIL,ZESTRIL) 10 MG tablet 180 tablet 3     Sig: Take 1 tablet by mouth.      Last office visit with prescribing clinician: 8/29/2022      Next office visit with prescribing clinician: Visit date not found       {TIP  Please add Last Relevant Lab 863/22    Mago Bal MA  09/19/22, 08:05 CDT

## 2022-09-26 DIAGNOSIS — G89.29 CHRONIC LEFT HIP PAIN: Primary | ICD-10-CM

## 2022-09-26 DIAGNOSIS — M25.552 CHRONIC LEFT HIP PAIN: Primary | ICD-10-CM

## 2022-10-07 DIAGNOSIS — M16.0 OSTEOARTHRITIS OF BOTH HIPS, UNSPECIFIED OSTEOARTHRITIS TYPE: Primary | ICD-10-CM

## 2022-10-17 RX ORDER — OMEPRAZOLE 20 MG/1
20 CAPSULE, DELAYED RELEASE ORAL DAILY
Qty: 90 CAPSULE | Refills: 3 | Status: SHIPPED | OUTPATIENT
Start: 2022-10-17

## 2022-10-17 NOTE — TELEPHONE ENCOUNTER
Rx Refill Note  Requested Prescriptions     Pending Prescriptions Disp Refills   • omeprazole (priLOSEC) 20 MG capsule 90 capsule 0     Sig: Take 1 capsule by mouth Daily.      Last office visit with prescribing clinician: 8/29/22  Next office visit with prescribing clinician: Visit date not found            Mago Bal MA  10/17/22, 07:42 CDT

## 2022-10-21 DIAGNOSIS — R73.9 HYPERGLYCEMIA: ICD-10-CM

## 2022-10-21 NOTE — TELEPHONE ENCOUNTER
Rx Refill Note  Requested Prescriptions     Pending Prescriptions Disp Refills   • metFORMIN (GLUCOPHAGE) 500 MG tablet 180 tablet 3     Sig: Take 1 tablet by mouth 2 (Two) Times a Day With Meals.      Last office visit with prescribing clinician: 8/29/2022      Next office visit with prescribing clinician: Visit date not found       {TIP  Please add Last Relevant Lab 7/26/22    Mago Bal MA  10/21/22, 07:34 CDT

## 2022-10-25 ENCOUNTER — FLU SHOT (OUTPATIENT)
Dept: FAMILY MEDICINE CLINIC | Facility: CLINIC | Age: 62
End: 2022-10-25

## 2022-10-25 DIAGNOSIS — Z23 NEED FOR INFLUENZA VACCINATION: Primary | ICD-10-CM

## 2022-10-25 PROCEDURE — 90686 IIV4 VACC NO PRSV 0.5 ML IM: CPT | Performed by: FAMILY MEDICINE

## 2022-10-25 PROCEDURE — 90471 IMMUNIZATION ADMIN: CPT | Performed by: FAMILY MEDICINE

## 2023-01-23 RX ORDER — FLUTICASONE PROPIONATE 50 MCG
2 SPRAY, SUSPENSION (ML) NASAL DAILY
Qty: 16 G | Refills: 6 | Status: SHIPPED | OUTPATIENT
Start: 2023-01-23

## 2023-01-23 NOTE — TELEPHONE ENCOUNTER
Pt is requesting a nose spray--whichever you think is best.    [facility]

## 2023-01-23 NOTE — TELEPHONE ENCOUNTER
Rx Refill Note  Requested Prescriptions     Pending Prescriptions Disp Refills   • fluticasone (FLONASE) 50 MCG/ACT nasal spray 16 g 6     Si sprays into the nostril(s) as directed by provider Daily.      Last office visit with prescribing clinician: 2022   Last telemedicine visit with prescribing clinician: Visit date not found   Next office visit with prescribing clinician: Visit date not found                         Would you like a call back once the refill request has been completed: [] Yes [] No    If the office needs to give you a call back, can they leave a voicemail: [] Yes [] No    Rivera John Rep  23, 09:22 CST

## 2023-03-17 ENCOUNTER — HOSPITAL ENCOUNTER (OUTPATIENT)
Dept: MAMMOGRAPHY | Facility: HOSPITAL | Age: 63
Discharge: HOME | End: 2023-03-17
Payer: COMMERCIAL

## 2023-03-17 ENCOUNTER — HOSPITAL ENCOUNTER (OUTPATIENT)
Dept: BONE DENSITY | Facility: HOSPITAL | Age: 63
Discharge: HOME | End: 2023-03-17
Payer: COMMERCIAL

## 2023-03-17 DIAGNOSIS — Z78.0 POST-MENOPAUSAL: ICD-10-CM

## 2023-03-17 DIAGNOSIS — Z12.11 SCREENING FOR COLORECTAL CANCER: ICD-10-CM

## 2023-03-17 DIAGNOSIS — Z12.12 SCREENING FOR COLORECTAL CANCER: ICD-10-CM

## 2023-03-17 PROCEDURE — 77067 SCR MAMMO BI INCL CAD: CPT

## 2023-03-17 PROCEDURE — 77063 BREAST TOMOSYNTHESIS BI: CPT

## 2023-03-17 PROCEDURE — 77080 DXA BONE DENSITY AXIAL: CPT

## 2023-07-21 PROBLEM — E66.3 OVERWEIGHT: Status: ACTIVE | Noted: 2023-07-21

## 2023-07-31 RX ORDER — PRAVASTATIN SODIUM 20 MG
20 TABLET ORAL NIGHTLY
Qty: 90 TABLET | Refills: 0 | Status: SHIPPED | OUTPATIENT
Start: 2023-07-31

## 2023-08-03 DIAGNOSIS — E66.01 MORBID (SEVERE) OBESITY DUE TO EXCESS CALORIES: Primary | ICD-10-CM

## 2023-08-03 RX ORDER — PHENTERMINE HYDROCHLORIDE 37.5 MG/1
37.5 CAPSULE ORAL EVERY MORNING
Qty: 30 CAPSULE | Refills: 0 | Status: SHIPPED | OUTPATIENT
Start: 2023-08-03

## 2023-08-11 ENCOUNTER — OFFICE VISIT (OUTPATIENT)
Dept: FAMILY MEDICINE CLINIC | Facility: CLINIC | Age: 63
End: 2023-08-11
Payer: COMMERCIAL

## 2023-08-11 VITALS
HEIGHT: 61 IN | DIASTOLIC BLOOD PRESSURE: 70 MMHG | BODY MASS INDEX: 29.15 KG/M2 | RESPIRATION RATE: 18 BRPM | OXYGEN SATURATION: 99 % | SYSTOLIC BLOOD PRESSURE: 128 MMHG | WEIGHT: 154.4 LBS | HEART RATE: 87 BPM | TEMPERATURE: 97.5 F

## 2023-08-11 DIAGNOSIS — R73.9 HYPERGLYCEMIA: ICD-10-CM

## 2023-08-11 DIAGNOSIS — G62.9 NEUROPATHY: Primary | ICD-10-CM

## 2023-08-11 DIAGNOSIS — E87.5 HYPERKALEMIA: ICD-10-CM

## 2023-08-11 DIAGNOSIS — E55.9 VITAMIN D DEFICIENCY: ICD-10-CM

## 2023-08-11 DIAGNOSIS — M65.9 SYNOVITIS: ICD-10-CM

## 2023-08-11 DIAGNOSIS — E78.2 MIXED HYPERLIPIDEMIA: ICD-10-CM

## 2023-08-11 DIAGNOSIS — Z12.31 SCREENING MAMMOGRAM FOR BREAST CANCER: ICD-10-CM

## 2023-08-11 DIAGNOSIS — Z00.00 ANNUAL PHYSICAL EXAM: ICD-10-CM

## 2023-08-11 RX ORDER — ESTRADIOL 0.1 MG/G
2 CREAM VAGINAL DAILY
Qty: 42.5 G | Refills: 12 | Status: SHIPPED | OUTPATIENT
Start: 2023-08-11

## 2023-08-11 RX ORDER — ATORVASTATIN CALCIUM 20 MG/1
20 TABLET, FILM COATED ORAL NIGHTLY
Qty: 90 TABLET | Refills: 3 | Status: SHIPPED | OUTPATIENT
Start: 2023-08-11

## 2023-08-11 RX ORDER — MECLIZINE HYDROCHLORIDE 25 MG/1
25 TABLET ORAL 3 TIMES DAILY PRN
Qty: 90 TABLET | Refills: 3 | Status: SHIPPED | OUTPATIENT
Start: 2023-08-11

## 2023-08-11 RX ORDER — MECLIZINE HYDROCHLORIDE 25 MG/1
25 TABLET ORAL 3 TIMES DAILY PRN
Qty: 90 TABLET | Refills: 3 | Status: SHIPPED | OUTPATIENT
Start: 2023-08-11 | End: 2023-08-11 | Stop reason: SDUPTHER

## 2023-08-11 RX ORDER — ATORVASTATIN CALCIUM 20 MG/1
20 TABLET, FILM COATED ORAL NIGHTLY
Qty: 90 TABLET | Refills: 3 | Status: SHIPPED | OUTPATIENT
Start: 2023-08-11 | End: 2023-08-11 | Stop reason: SDUPTHER

## 2023-08-11 RX ORDER — ONDANSETRON 4 MG/1
4 TABLET, FILM COATED ORAL EVERY 8 HOURS PRN
Qty: 10 TABLET | Refills: 2 | Status: SHIPPED | OUTPATIENT
Start: 2023-08-11 | End: 2023-08-11 | Stop reason: SDUPTHER

## 2023-08-11 RX ORDER — ONDANSETRON 4 MG/1
4 TABLET, FILM COATED ORAL EVERY 8 HOURS PRN
Qty: 10 TABLET | Refills: 2 | Status: SHIPPED | OUTPATIENT
Start: 2023-08-11

## 2023-08-11 RX ORDER — GABAPENTIN 300 MG/1
300 CAPSULE ORAL 3 TIMES DAILY
Qty: 30 CAPSULE | Refills: 0 | Status: SHIPPED | OUTPATIENT
Start: 2023-08-11

## 2023-08-11 NOTE — PROGRESS NOTES
Chief Complaint   Patient presents with    Annual Exam     Fasting       History:  Ronit Robbins is a 63 y.o. female who presents today for evaluation of the above problems.      63-year-old female for wellness exam        ROS:  Review of Systems   Respiratory:  Negative for shortness of breath.    Cardiovascular:  Negative for chest pain and leg swelling.   Gastrointestinal:         Colonoscopy up-to-date   Musculoskeletal:  Positive for arthralgias and back pain.        Idiopathic scoliosis   Psychiatric/Behavioral:  Negative for self-injury.    All other systems reviewed and are negative.    No Known Allergies  Past Medical History:   Diagnosis Date    Hyperlipidemia     Hypertension     Scoliosis      Past Surgical History:   Procedure Laterality Date    HAND SURGERY      HYSTERECTOMY      SPINAL FUSION      VEIN SURGERY       Family History   Problem Relation Age of Onset    No Known Problems Mother     No Known Problems Father     No Known Problems Sister     Dementia Brother     No Known Problems Son     Heart disease Maternal Grandmother     No Known Problems Maternal Grandfather     Cancer Paternal Grandmother     Cancer Paternal Grandfather     Breast cancer Maternal Aunt     Breast cancer Paternal Aunt       reports that she has never smoked. She has never used smokeless tobacco. She reports current alcohol use. She reports that she does not use drugs.      Current Outpatient Medications:     atorvastatin (LIPITOR) 20 MG tablet, Take 1 tablet by mouth Every Night., Disp: 90 tablet, Rfl: 3    cyclobenzaprine (FLEXERIL) 10 MG tablet, Take 1 tablet by mouth 3 (Three) Times a Day As Needed for Muscle Spasms. (Patient taking differently: Take 1 tablet by mouth 2 (Two) Times a Day As Needed for Muscle Spasms.), Disp: 270 tablet, Rfl: 3    fluticasone (FLONASE) 50 MCG/ACT nasal spray, Instill 2 sprays into each nostril as directed by provider Daily., Disp: 16 g, Rfl: 6    indapamide (LOZOL) 1.25 MG tablet, Take  "1 tablet by mouth Every Morning., Disp: 90 tablet, Rfl: 3    lisinopril (PRINIVIL,ZESTRIL) 10 MG tablet, Take 1 tablet by mouth 2 (Two) Times a Day., Disp: 180 tablet, Rfl: 3    meclizine (ANTIVERT) 25 MG tablet, Take 1 tablet by mouth 3 (Three) Times a Day As Needed for Dizziness., Disp: 90 tablet, Rfl: 3    metFORMIN (GLUCOPHAGE) 500 MG tablet, Take 1 tablet by mouth 2 (Two) Times a Day With Meals. (Patient taking differently: Take 1 tablet by mouth Every Night.), Disp: 180 tablet, Rfl: 3    methocarbamol (ROBAXIN) 500 MG tablet, Take 1 tablet by mouth 4 (Four) Times a Day. (Patient taking differently: Take 1 tablet by mouth 2 (Two) Times a Day.), Disp: 120 tablet, Rfl: 0    omeprazole (priLOSEC) 20 MG capsule, Take 1 capsule by mouth Daily., Disp: 90 capsule, Rfl: 3    ondansetron (ZOFRAN) 4 MG tablet, Take 1 tablet by mouth Every 8 (Eight) Hours As Needed for Nausea or Vomiting., Disp: 10 tablet, Rfl: 2    phentermine 37.5 MG capsule, Take 1 capsule by mouth Every Morning., Disp: 30 capsule, Rfl: 0    Stannous Fluoride (Gel-Justo) 0.4 % gel, Take as directed, Disp: 244 g, Rfl: 5    estradiol (ESTRACE VAGINAL) 0.1 MG/GM vaginal cream, Insert 2 g into the vagina Daily., Disp: 42.5 g, Rfl: 12    gabapentin (NEURONTIN) 300 MG capsule, Take 1 capsule by mouth 3 (Three) Times a Day., Disp: 30 capsule, Rfl: 0    OBJECTIVE:  /70 (BP Location: Left arm, Patient Position: Sitting, Cuff Size: Adult)   Pulse 87   Temp 97.5 øF (36.4 øC) (Temporal)   Resp 18   Ht 154.9 cm (61\")   Wt 70 kg (154 lb 6.4 oz)   LMP  (LMP Unknown)   SpO2 99%   BMI 29.17 kg/mý    Physical Exam  Vitals and nursing note reviewed.   Constitutional:       Appearance: Normal appearance.   HENT:      Right Ear: Tympanic membrane and ear canal normal.      Left Ear: Tympanic membrane and ear canal normal.   Eyes:      Extraocular Movements: Extraocular movements intact.      Pupils: Pupils are equal, round, and reactive to light.   Neck:      " Vascular: No carotid bruit.   Cardiovascular:      Rate and Rhythm: Normal rate and regular rhythm.   Pulmonary:      Effort: Pulmonary effort is normal.      Breath sounds: Normal breath sounds.      Comments: Breast no masses noted  Abdominal:      General: Abdomen is flat.      Palpations: Abdomen is soft. There is no mass.   Genitourinary:     Comments: Surgical past  Musculoskeletal:      Right lower leg: No edema.      Left lower leg: No edema.   Lymphadenopathy:      Cervical: No cervical adenopathy.   Skin:     General: Skin is warm and dry.   Neurological:      General: No focal deficit present.      Mental Status: She is alert and oriented to person, place, and time.   Psychiatric:         Mood and Affect: Mood normal.         Behavior: Behavior normal.         Thought Content: Thought content normal.         Judgment: Judgment normal.       BMI is >= 25 and <30. (Overweight) The following options were offered after discussion;: exercise counseling/recommendations       Health Maintenance:  Immunization History   Administered Date(s) Administered    COVID-19 (PFIZER) Purple Cap Monovalent 01/26/2021, 02/16/2021, 11/15/2021    Covid-19 (Pfizer) Gray Cap Monovalent 08/16/2022    Fluzone >6mos 10/13/2020, 10/07/2021, 10/25/2022    Influenza, Unspecified 10/09/2018    Pneumococcal Polysaccharide (PPSV23) 02/05/2013    Shingrix 11/23/2019, 08/28/2020    Tdap 05/04/2018        Up-to-date after today    Assessment/Plan    Diagnoses and all orders for this visit:    1. Neuropathy (Primary)  -     Cancel: Vitamin B12  -     Cancel: Folate  -     gabapentin (NEURONTIN) 300 MG capsule; Take 1 capsule by mouth 3 (Three) Times a Day.  Dispense: 30 capsule; Refill: 0    2. Hyperglycemia  -     Cancel: Hemoglobin A1c  -     POC Urinalysis Dipstick, Multipro    3. Annual physical exam  -     Cancel: TSH  -     Cancel: T4, free  -     Cancel: CBC & Differential  -     Cancel: Comprehensive Metabolic Panel  -     Cancel:  Lipid Panel    4. Vitamin D deficiency  -     Cancel: Vitamin D,25-Hydroxy    5. Screening mammogram for breast cancer  -     Mammo Screening Digital Tomosynthesis Bilateral With CAD; Future    6. Hyperkalemia  -     Basic Metabolic Panel    7. Synovitis  -     C-reactive Protein  -     Sedimentation Rate    8. Mixed hyperlipidemia  -     CT Cardiac Calcium Score Without Dye; Future    Other orders  -     Discontinue: meclizine (ANTIVERT) 25 MG tablet; Take 1 tablet by mouth 3 (Three) Times a Day As Needed for Dizziness.  Dispense: 90 tablet; Refill: 3  -     Discontinue: ondansetron (ZOFRAN) 4 MG tablet; Take 1 tablet by mouth Every 8 (Eight) Hours As Needed for Nausea or Vomiting.  Dispense: 10 tablet; Refill: 2  -     Discontinue: atorvastatin (LIPITOR) 20 MG tablet; Take 1 tablet by mouth Every Night.  Dispense: 90 tablet; Refill: 3  -     ondansetron (ZOFRAN) 4 MG tablet; Take 1 tablet by mouth Every 8 (Eight) Hours As Needed for Nausea or Vomiting.  Dispense: 10 tablet; Refill: 2  -     meclizine (ANTIVERT) 25 MG tablet; Take 1 tablet by mouth 3 (Three) Times a Day As Needed for Dizziness.  Dispense: 90 tablet; Refill: 3  -     atorvastatin (LIPITOR) 20 MG tablet; Take 1 tablet by mouth Every Night.  Dispense: 90 tablet; Refill: 3  -     estradiol (ESTRACE VAGINAL) 0.1 MG/GM vaginal cream; Insert 2 g into the vagina Daily.  Dispense: 42.5 g; Refill: 12      Plan above-repeating lab based on previous recent lab    An After Visit Summary was printed and given to the patient at discharge.  Return in 6 months (on 2/11/2024).         Dmitriy Awad MD 8/11/2023   Electronically signed.

## 2023-08-12 LAB
BUN SERPL-MCNC: 22 MG/DL (ref 8–23)
BUN/CREAT SERPL: 30.1 (ref 7–25)
CALCIUM SERPL-MCNC: 10.2 MG/DL (ref 8.6–10.5)
CHLORIDE SERPL-SCNC: 96 MMOL/L (ref 98–107)
CO2 SERPL-SCNC: 28.3 MMOL/L (ref 22–29)
CREAT SERPL-MCNC: 0.73 MG/DL (ref 0.57–1)
CRP SERPL-MCNC: <0.3 MG/DL (ref 0–0.5)
EGFRCR SERPLBLD CKD-EPI 2021: 92 ML/MIN/1.73
ERYTHROCYTE [SEDIMENTATION RATE] IN BLOOD BY WESTERGREN METHOD: 3 MM/HR (ref 0–30)
GLUCOSE SERPL-MCNC: 100 MG/DL (ref 65–99)
POTASSIUM SERPL-SCNC: 4.9 MMOL/L (ref 3.5–5.2)
SODIUM SERPL-SCNC: 136 MMOL/L (ref 136–145)

## 2023-08-28 DIAGNOSIS — E66.01 MORBID (SEVERE) OBESITY DUE TO EXCESS CALORIES: ICD-10-CM

## 2023-08-28 RX ORDER — PHENTERMINE HYDROCHLORIDE 37.5 MG/1
37.5 CAPSULE ORAL EVERY MORNING
Qty: 30 CAPSULE | Refills: 0 | Status: SHIPPED | OUTPATIENT
Start: 2023-08-28

## 2023-09-04 DIAGNOSIS — I10 ESSENTIAL HYPERTENSION: Primary | ICD-10-CM

## 2023-09-05 ENCOUNTER — CLINICAL SUPPORT (OUTPATIENT)
Dept: FAMILY MEDICINE CLINIC | Facility: CLINIC | Age: 63
End: 2023-09-05
Payer: COMMERCIAL

## 2023-09-05 DIAGNOSIS — J01.40 ACUTE PANSINUSITIS, RECURRENCE NOT SPECIFIED: Primary | ICD-10-CM

## 2023-09-05 DIAGNOSIS — J01.00 ACUTE NON-RECURRENT MAXILLARY SINUSITIS: Primary | ICD-10-CM

## 2023-09-05 PROCEDURE — 96372 THER/PROPH/DIAG INJ SC/IM: CPT | Performed by: NURSE PRACTITIONER

## 2023-09-05 RX ORDER — LISINOPRIL 10 MG/1
10 TABLET ORAL 2 TIMES DAILY
Qty: 180 TABLET | Refills: 3 | Status: SHIPPED | OUTPATIENT
Start: 2023-09-05

## 2023-09-05 RX ORDER — TRIAMCINOLONE ACETONIDE 40 MG/ML
40 INJECTION, SUSPENSION INTRA-ARTICULAR; INTRAMUSCULAR ONCE
Status: COMPLETED | OUTPATIENT
Start: 2023-09-05 | End: 2023-09-05

## 2023-09-05 RX ORDER — INDAPAMIDE 1.25 MG/1
1.25 TABLET ORAL EVERY MORNING
Qty: 90 TABLET | Refills: 3 | Status: SHIPPED | OUTPATIENT
Start: 2023-09-05

## 2023-09-05 RX ORDER — AMOXICILLIN 500 MG/1
1000 CAPSULE ORAL 2 TIMES DAILY
Qty: 28 CAPSULE | Refills: 0 | Status: SHIPPED | OUTPATIENT
Start: 2023-09-05 | End: 2023-09-12

## 2023-09-05 RX ADMIN — TRIAMCINOLONE ACETONIDE 40 MG: 40 INJECTION, SUSPENSION INTRA-ARTICULAR; INTRAMUSCULAR at 13:18

## 2023-09-05 NOTE — TELEPHONE ENCOUNTER
Rx Refill Note  Requested Prescriptions     Pending Prescriptions Disp Refills    lisinopril (PRINIVIL,ZESTRIL) 10 MG tablet 180 tablet 3     Sig: Take 1 tablet by mouth 2 (Two) Times a Day.      Last office visit with prescribing clinician: 8/11/2023   Last telemedicine visit with prescribing clinician: Visit date not found   Next office visit with prescribing clinician: Visit date not found       {TIP  Please add Last Relevant Lab 8/11/23    Mago Bal MA  09/05/23, 08:17 CDT

## 2023-09-05 NOTE — PROGRESS NOTES
Patient with sinus drainage and sore throat came in today for Kenalog 40mg after testing negative for covid with home test.  Patient tolerated injection well.

## 2023-09-05 NOTE — TELEPHONE ENCOUNTER
Rx Refill Note  Requested Prescriptions     Pending Prescriptions Disp Refills    indapamide (LOZOL) 1.25 MG tablet 90 tablet 3     Sig: Take 1 tablet by mouth Every Morning.      Last office visit with prescribing clinician: 8/11/2023   Last telemedicine visit with prescribing clinician: Visit date not found   Next office visit with prescribing clinician: Visit date not found       {TIP  Please add Last Relevant Lab 8/11/23    Mago Bal MA  09/05/23, 08:50 CDT

## 2023-10-10 NOTE — TELEPHONE ENCOUNTER
Rx Refill Note  Requested Prescriptions     Pending Prescriptions Disp Refills    omeprazole (priLOSEC) 20 MG capsule 90 capsule 3     Sig: Take 1 capsule by mouth Daily.      Last office visit with prescribing clinician: 8/11/2023   Last telemedicine visit with prescribing clinician: Visit date not found   Next office visit with prescribing clinician: Visit date not found       UNABLE TO FIND [facility]  IN [facility].  CALLED IN AND LEFT ON RECORDING BECAUSE WHEN I TRIED TO SPEAK TO THE PHARMACY THE CALL WOULD BE DROPPED.                      Would you like a call back once the refill request has been completed: [] Yes [] No    If the office needs to give you a call back, can they leave a voicemail: [] Yes [] No    Mago Geiger MA  10/10/23, 16:21 CDT

## 2023-10-11 RX ORDER — OMEPRAZOLE 20 MG/1
20 CAPSULE, DELAYED RELEASE ORAL DAILY
Qty: 90 CAPSULE | Refills: 3 | OUTPATIENT
Start: 2023-10-11

## 2023-10-13 RX ORDER — AZITHROMYCIN 250 MG/1
TABLET, FILM COATED ORAL
Qty: 6 TABLET | Refills: 0 | Status: SHIPPED | OUTPATIENT
Start: 2023-10-13

## 2023-10-13 NOTE — TELEPHONE ENCOUNTER
Ana never received--called and spoke with Fahad @  Pharmacy--verbal order given for omeprazole 20mg (1) daily #90 and 3 refills. Verbalized.

## 2023-10-13 NOTE — TELEPHONE ENCOUNTER
Home covid yesterday negative.    Sinus issues for about 6 wks ago and never has gotten over it.  Using otc meds and nasal sprays  Cough & congestion--colored drainage.  Would you need to see in office, continue current meds or can send rx. NO fever but she is wearing down.      [facility] for short term or [facility] for long term.

## 2023-10-13 NOTE — TELEPHONE ENCOUNTER
Rx Refill Note  Requested Prescriptions     Pending Prescriptions Disp Refills    azithromycin (Zithromax Z-Kahlil) 250 MG tablet 6 tablet 0     Sig: Take 2 tablets by mouth on day 1, then 1 tablet daily on days 2-5      Last office visit with prescribing clinician: 8/11/2023   Last telemedicine visit with prescribing clinician: Visit date not found   Next office visit with prescribing clinician: Visit date not found                         Would you like a call back once the refill request has been completed: [] Yes [] No    If the office needs to give you a call back, can they leave a voicemail: [] Yes [] No    Rivera John Rep  10/13/23, 10:46 CDT

## 2023-10-13 NOTE — TELEPHONE ENCOUNTER
Pt has not received medication yet.  Reached out to [facility] and spoke with Hunter in [facility] pharmacy.  He could not locate anything and voicemail was never received.  I requested for him to send us an escript to us since we could not locate pharmacy in [facility].      Direct line to pharmacy is [phone]  or can try to call [phone] extension [phone].

## 2023-11-06 ENCOUNTER — FLU SHOT (OUTPATIENT)
Dept: FAMILY MEDICINE CLINIC | Facility: CLINIC | Age: 63
End: 2023-11-06
Payer: COMMERCIAL

## 2023-11-06 DIAGNOSIS — Z23 NEED FOR INFLUENZA VACCINATION: Primary | ICD-10-CM

## 2023-11-06 PROCEDURE — 90686 IIV4 VACC NO PRSV 0.5 ML IM: CPT | Performed by: FAMILY MEDICINE

## 2023-11-06 PROCEDURE — 90471 IMMUNIZATION ADMIN: CPT | Performed by: FAMILY MEDICINE

## 2023-11-07 ENCOUNTER — IMMUNIZATION (OUTPATIENT)
Dept: FAMILY MEDICINE CLINIC | Facility: CLINIC | Age: 63
End: 2023-11-07
Payer: COMMERCIAL

## 2023-11-07 DIAGNOSIS — Z23 NEED FOR COVID-19 VACCINE: Primary | ICD-10-CM

## 2023-11-29 DIAGNOSIS — M79.18 MUSCLE PAIN, LUMBAR: Primary | ICD-10-CM

## 2023-11-29 RX ORDER — METHOCARBAMOL 500 MG/1
500 TABLET, FILM COATED ORAL 2 TIMES DAILY
Qty: 180 TABLET | Refills: 2 | Status: SHIPPED | OUTPATIENT
Start: 2023-11-29

## 2023-11-29 NOTE — TELEPHONE ENCOUNTER
Rx Refill Note  Requested Prescriptions     Pending Prescriptions Disp Refills    methocarbamol (ROBAXIN) 500 MG tablet [Pharmacy Med Name: Methocarbamol 500 MG Oral Tablet (ROBAXIN)] 120 tablet 0     Sig: Take 1 tablet by mouth 4 (Four) Times a Day.      Last office visit with prescribing clinician: 8/11/2023   Last telemedicine visit with prescribing clinician: Visit date not found   Next office visit with prescribing clinician: Visit date not found   CPE done 8/11/2024    {TIP  Please add Last Relevant Lab Date if appropriate 07/18/2023             {TIP  Is Refill Pharmacy correct?: YES    Would you like a call back once the refill request has been completed: [] Yes [x] No    If the office needs to give you a call back, can they leave a voicemail: [] Yes [] No    Mago Geiger MA  11/29/23, 14:43 CST

## 2023-12-13 ENCOUNTER — CLINICAL SUPPORT (OUTPATIENT)
Dept: FAMILY MEDICINE CLINIC | Facility: CLINIC | Age: 63
End: 2023-12-13
Payer: COMMERCIAL

## 2023-12-13 DIAGNOSIS — Z20.822 CLOSE EXPOSURE TO COVID-19 VIRUS: Primary | ICD-10-CM

## 2023-12-13 LAB
EXPIRATION DATE: NORMAL
FLUAV AG UPPER RESP QL IA.RAPID: NOT DETECTED
FLUBV AG UPPER RESP QL IA.RAPID: NOT DETECTED
INTERNAL CONTROL: NORMAL
Lab: NORMAL
SARS-COV-2 AG UPPER RESP QL IA.RAPID: NOT DETECTED

## 2023-12-13 RX ORDER — AZITHROMYCIN 250 MG/1
TABLET, FILM COATED ORAL
Qty: 6 TABLET | Refills: 0 | Status: SHIPPED | OUTPATIENT
Start: 2023-12-13

## 2023-12-13 NOTE — PROGRESS NOTES
Patient has had covid exposure over the weekend.  Patient has a cough, low grade fever, body aches, sore throat.  Patient had a negative home covid test and is requesting to be retested.  Results sent to EDGARD Hennessy to review.  Patient advised medication will be sent to the pharmacy.

## 2023-12-19 ENCOUNTER — TELEPHONE (OUTPATIENT)
Dept: FAMILY MEDICINE CLINIC | Facility: CLINIC | Age: 63
End: 2023-12-19
Payer: COMMERCIAL

## 2023-12-19 RX ORDER — DEXTROMETHORPHAN HYDROBROMIDE AND PROMETHAZINE HYDROCHLORIDE 15; 6.25 MG/5ML; MG/5ML
5 SYRUP ORAL 4 TIMES DAILY PRN
Qty: 120 ML | Refills: 1 | Status: SHIPPED | OUTPATIENT
Start: 2023-12-19

## 2023-12-19 NOTE — TELEPHONE ENCOUNTER
Taking mucinex and drinking plenty of water.  Has a dry cough and it keeps her up at night.  Has finished Zpack and wanting to know if you can send something in.    [facility]

## 2024-01-02 DIAGNOSIS — R73.9 HYPERGLYCEMIA: ICD-10-CM

## 2024-01-02 NOTE — TELEPHONE ENCOUNTER
Rx Refill Note  Requested Prescriptions     Pending Prescriptions Disp Refills    metFORMIN (GLUCOPHAGE) 500 MG tablet 180 tablet 3     Sig: Take 1 tablet by mouth 2 (Two) Times a Day With Meals.      Last office visit with prescribing clinician: 8/11/2023   Last telemedicine visit with prescribing clinician: Visit date not found   Next office visit with prescribing clinician: Visit date not found                         Would you like a call back once the refill request has been completed: [] Yes [] No    If the office needs to give you a call back, can they leave a voicemail: [] Yes [] No    Loren Khan, PCT  01/02/24, 08:55 CST

## 2024-01-04 DIAGNOSIS — R73.9 HYPERGLYCEMIA: ICD-10-CM

## 2024-01-04 NOTE — TELEPHONE ENCOUNTER
Rx Refill Note  Requested Prescriptions     Pending Prescriptions Disp Refills    metFORMIN (GLUCOPHAGE) 500 MG tablet 180 tablet 2     Sig: Take 1 tablet by mouth 2 (Two) Times a Day With Meals.      Last office visit with prescribing clinician: 8/11/2023   Last telemedicine visit with prescribing clinician: Visit date not found   Next office visit with prescribing clinician: Visit date not found                         Would you like a call back once the refill request has been completed: [] Yes [] No    If the office needs to give you a call back, can they leave a voicemail: [] Yes [] No    Rivera John Rep  01/04/24, 08:25 CST

## 2024-02-19 RX ORDER — FLUTICASONE PROPIONATE 50 MCG
2 SPRAY, SUSPENSION (ML) NASAL DAILY
Qty: 48 G | Refills: 1 | Status: SHIPPED | OUTPATIENT
Start: 2024-02-19

## 2024-02-19 NOTE — TELEPHONE ENCOUNTER
REQUESTED 3 MONTH SUPPLY        Rx Refill Note  Requested Prescriptions     Pending Prescriptions Disp Refills    fluticasone (FLONASE) 50 MCG/ACT nasal spray [Pharmacy Med Name: Fluticasone Propionate 50 MCG/ACT Nasal Suspension (FLONASE)] 48 g 1     Sig: Instill 2 sprays into each nostril as directed by provider Daily.      Last office visit with prescribing clinician: 8/11/2023   Last telemedicine visit with prescribing clinician: Visit date not found   Next office visit with prescribing clinician: Visit date not found                         Would you like a call back once the refill request has been completed: [] Yes [] No    If the office needs to give you a call back, can they leave a voicemail: [] Yes [] No    Loren Khan, PCT  02/19/24, 09:13 CST

## 2024-04-15 DIAGNOSIS — G62.9 NEUROPATHY: ICD-10-CM

## 2024-04-15 LAB
NCCN CRITERIA FLAG: NORMAL
TYRER CUZICK SCORE: 10.3

## 2024-04-15 RX ORDER — OMEPRAZOLE 20 MG/1
20 CAPSULE, DELAYED RELEASE ORAL DAILY
Qty: 90 CAPSULE | Refills: 3 | OUTPATIENT
Start: 2024-04-15

## 2024-04-15 RX ORDER — GABAPENTIN 300 MG/1
300 CAPSULE ORAL 3 TIMES DAILY
Qty: 30 CAPSULE | Refills: 0 | Status: SHIPPED | OUTPATIENT
Start: 2024-04-15

## 2024-04-15 NOTE — TELEPHONE ENCOUNTER
Rx Refill Note  Requested Prescriptions     Pending Prescriptions Disp Refills    omeprazole (priLOSEC) 20 MG capsule 90 capsule 3     Sig: Take 1 capsule by mouth Daily.    gabapentin (NEURONTIN) 300 MG capsule 30 capsule 0     Sig: Take 1 capsule by mouth 3 (Three) Times a Day.      Last office visit with prescribing clinician: 8/11/2023   Last telemedicine visit with prescribing clinician: Visit date not found   Next office visit with prescribing clinician: Visit date not found                         Would you like a call back once the refill request has been completed: [] Yes [] No    If the office needs to give you a call back, can they leave a voicemail: [] Yes [] No    Loren Khan, PCT  04/15/24, 10:07 CDT

## 2024-04-18 ENCOUNTER — HOSPITAL ENCOUNTER (OUTPATIENT)
Dept: MAMMOGRAPHY | Facility: HOSPITAL | Age: 64
Discharge: HOME | End: 2024-04-18
Admitting: FAMILY MEDICINE
Payer: MEDICARE

## 2024-04-18 DIAGNOSIS — Z12.31 SCREENING MAMMOGRAM FOR BREAST CANCER: ICD-10-CM

## 2024-04-18 PROCEDURE — 77067 SCR MAMMO BI INCL CAD: CPT

## 2024-04-18 PROCEDURE — 77063 BREAST TOMOSYNTHESIS BI: CPT

## 2024-05-09 RX ORDER — CYCLOBENZAPRINE HCL 10 MG
10 TABLET ORAL 2 TIMES DAILY PRN
Qty: 180 TABLET | Refills: 1 | Status: SHIPPED | OUTPATIENT
Start: 2024-05-09

## 2024-06-03 DIAGNOSIS — E78.2 MIXED HYPERLIPIDEMIA: Primary | ICD-10-CM

## 2024-06-03 RX ORDER — ATORVASTATIN CALCIUM 20 MG/1
20 TABLET, FILM COATED ORAL NIGHTLY
Qty: 90 TABLET | Refills: 0 | Status: SHIPPED | OUTPATIENT
Start: 2024-06-03

## 2024-06-03 NOTE — TELEPHONE ENCOUNTER
Rx Refill Note  Requested Prescriptions     Pending Prescriptions Disp Refills    atorvastatin (LIPITOR) 20 MG tablet 90 tablet 0     Sig: Take 1 tablet by mouth Every Night.      Last office visit with prescribing clinician: 8/11/2023   Last telemedicine visit with prescribing clinician: Visit date not found   Next office visit with prescribing clinician: Visit date not found                         Would you like a call back once the refill request has been completed: [] Yes [] No    If the office needs to give you a call back, can they leave a voicemail: [] Yes [] No    Rivera John Rep  06/03/24, 14:36 CDT

## 2024-06-13 ENCOUNTER — OFFICE VISIT (OUTPATIENT)
Dept: FAMILY MEDICINE CLINIC | Facility: CLINIC | Age: 64
End: 2024-06-13
Payer: MEDICARE

## 2024-06-13 VITALS
HEART RATE: 79 BPM | HEIGHT: 61 IN | BODY MASS INDEX: 29.3 KG/M2 | DIASTOLIC BLOOD PRESSURE: 78 MMHG | RESPIRATION RATE: 16 BRPM | OXYGEN SATURATION: 99 % | TEMPERATURE: 97.1 F | SYSTOLIC BLOOD PRESSURE: 124 MMHG | WEIGHT: 155.2 LBS

## 2024-06-13 DIAGNOSIS — E66.01 MORBID (SEVERE) OBESITY DUE TO EXCESS CALORIES: Primary | ICD-10-CM

## 2024-06-13 PROCEDURE — 3074F SYST BP LT 130 MM HG: CPT | Performed by: FAMILY MEDICINE

## 2024-06-13 PROCEDURE — 1160F RVW MEDS BY RX/DR IN RCRD: CPT | Performed by: FAMILY MEDICINE

## 2024-06-13 PROCEDURE — 1126F AMNT PAIN NOTED NONE PRSNT: CPT | Performed by: FAMILY MEDICINE

## 2024-06-13 PROCEDURE — 3078F DIAST BP <80 MM HG: CPT | Performed by: FAMILY MEDICINE

## 2024-06-13 PROCEDURE — 1159F MED LIST DOCD IN RCRD: CPT | Performed by: FAMILY MEDICINE

## 2024-06-13 PROCEDURE — 99213 OFFICE O/P EST LOW 20 MIN: CPT | Performed by: FAMILY MEDICINE

## 2024-06-13 RX ORDER — PHENTERMINE HYDROCHLORIDE 15 MG/1
15 CAPSULE ORAL EVERY MORNING
Qty: 30 CAPSULE | Refills: 0 | Status: SHIPPED | OUTPATIENT
Start: 2024-06-13

## 2024-06-13 NOTE — PROGRESS NOTES
Subjective   Ronit Robbins is a 64 y.o. female.     History of Present Illness  64-year-old female with increased BMI hypertension hyperlipidemia      The following portions of the patient's history were reviewed and updated as appropriate: allergies, current medications, past family history, past medical history, past social history, past surgical history, and problem list.    Review of Systems   Constitutional:         The patient is a fasting eating slowly and watching calories   Respiratory:  Negative for shortness of breath.    Cardiovascular:  Negative for chest pain and leg swelling.   Musculoskeletal:  Positive for arthralgias.       Objective   Physical Exam  Vitals and nursing note reviewed.   Constitutional:       Appearance: She is obese.   Eyes:      Extraocular Movements: Extraocular movements intact.      Pupils: Pupils are equal, round, and reactive to light.   Cardiovascular:      Rate and Rhythm: Normal rate and regular rhythm.   Pulmonary:      Effort: Pulmonary effort is normal.      Breath sounds: Normal breath sounds.   Musculoskeletal:      Right lower leg: No edema.      Left lower leg: No edema.   Skin:     General: Skin is warm and dry.   Neurological:      General: No focal deficit present.      Mental Status: She is alert and oriented to person, place, and time.   Psychiatric:         Mood and Affect: Mood normal.         Behavior: Behavior normal.         Thought Content: Thought content normal.         Judgment: Judgment normal.         Assessment & Plan   Diagnoses and all orders for this visit:    1. Morbid (severe) obesity due to excess calories (Primary)  -     phentermine 15 MG capsule; Take 1 capsule by mouth Every Morning.  Dispense: 30 capsule; Refill: 0         Plan above-I feel like she is a semaglutide candidate with a BMI over 27 and comorbidities i.e. cholesterol blood pressure elevations

## 2024-06-14 ENCOUNTER — PRIOR AUTHORIZATION (OUTPATIENT)
Dept: FAMILY MEDICINE CLINIC | Facility: CLINIC | Age: 64
End: 2024-06-14
Payer: MEDICARE

## 2024-06-14 DIAGNOSIS — E66.01 MORBID (SEVERE) OBESITY DUE TO EXCESS CALORIES: Primary | ICD-10-CM

## 2024-06-14 DIAGNOSIS — E78.2 MIXED HYPERLIPIDEMIA: ICD-10-CM

## 2024-06-14 DIAGNOSIS — R73.9 HYPERGLYCEMIA: ICD-10-CM

## 2024-06-17 ENCOUNTER — TELEPHONE (OUTPATIENT)
Dept: FAMILY MEDICINE CLINIC | Facility: CLINIC | Age: 64
End: 2024-06-17
Payer: MEDICARE

## 2024-06-17 NOTE — TELEPHONE ENCOUNTER
Outcome  Denied on June 14  Denied. This drug is used for weight loss purposes, which is one of the classes not covered under the Part D coverage. We do not offer supplemental benefit that would cover this drug. Section 1927(d)(2) of the Social Security Act (the Act) permits the exclusion of certain drugs or classes of drugs from coverage under Part D.

## 2024-06-17 NOTE — TELEPHONE ENCOUNTER
Patient's insurnace will not cover wegovy and zepbound for weight loss.  Pt not diabetic so we can not get those covered either. Pt asking if she could have the phenetamine for a few months.

## 2024-06-27 DIAGNOSIS — M79.18 MUSCLE PAIN, LUMBAR: ICD-10-CM

## 2024-06-27 RX ORDER — METHOCARBAMOL 500 MG/1
500 TABLET, FILM COATED ORAL 2 TIMES DAILY
Qty: 180 TABLET | Refills: 2 | Status: SHIPPED | OUTPATIENT
Start: 2024-06-27

## 2024-06-27 RX ORDER — CYCLOBENZAPRINE HCL 10 MG
10 TABLET ORAL 2 TIMES DAILY PRN
Qty: 180 TABLET | Refills: 1 | Status: CANCELLED | OUTPATIENT
Start: 2024-06-27

## 2024-06-27 RX ORDER — INDAPAMIDE 1.25 MG/1
1.25 TABLET ORAL EVERY MORNING
Qty: 90 TABLET | Refills: 3 | Status: SHIPPED | OUTPATIENT
Start: 2024-06-27

## 2024-06-27 NOTE — TELEPHONE ENCOUNTER
Rx Refill Note  Requested Prescriptions     Pending Prescriptions Disp Refills    indapamide (LOZOL) 1.25 MG tablet 90 tablet 3     Sig: Take 1 tablet by mouth Every Morning.    methocarbamol (ROBAXIN) 500 MG tablet 180 tablet 2     Sig: Take 1 tablet by mouth 2 (Two) Times a Day.    cyclobenzaprine (FLEXERIL) 10 MG tablet 180 tablet 1     Sig: Take 1 tablet by mouth 2 (Two) Times a Day As Needed for Muscle Spasms.      Last office visit with prescribing clinician: 6/13/2024   Last telemedicine visit with prescribing clinician: Visit date not found   Next office visit with prescribing clinician: 7/30/2024                         Would you like a call back once the refill request has been completed: [] Yes [] No    If the office needs to give you a call back, can they leave a voicemail: [] Yes [] No    Rivera John Rep  06/27/24, 10:13 CDT

## 2024-07-29 DIAGNOSIS — R73.9 HYPERGLYCEMIA: Primary | ICD-10-CM

## 2024-07-30 ENCOUNTER — OFFICE VISIT (OUTPATIENT)
Dept: FAMILY MEDICINE CLINIC | Facility: CLINIC | Age: 64
End: 2024-07-30
Payer: MEDICARE

## 2024-07-30 ENCOUNTER — PATIENT ROUNDING (BHMG ONLY) (OUTPATIENT)
Dept: FAMILY MEDICINE CLINIC | Facility: CLINIC | Age: 64
End: 2024-07-30

## 2024-07-30 VITALS
OXYGEN SATURATION: 98 % | HEART RATE: 92 BPM | BODY MASS INDEX: 28.96 KG/M2 | WEIGHT: 153.4 LBS | HEIGHT: 61 IN | DIASTOLIC BLOOD PRESSURE: 72 MMHG | RESPIRATION RATE: 16 BRPM | SYSTOLIC BLOOD PRESSURE: 122 MMHG

## 2024-07-30 DIAGNOSIS — R73.9 HYPERGLYCEMIA: ICD-10-CM

## 2024-07-30 DIAGNOSIS — Z12.11 SCREENING FOR COLORECTAL CANCER: ICD-10-CM

## 2024-07-30 DIAGNOSIS — Z00.00 MEDICARE WELCOME EXAM: ICD-10-CM

## 2024-07-30 DIAGNOSIS — Z12.31 SCREENING MAMMOGRAM FOR BREAST CANCER: ICD-10-CM

## 2024-07-30 DIAGNOSIS — Z12.12 SCREENING FOR COLORECTAL CANCER: ICD-10-CM

## 2024-07-30 DIAGNOSIS — R53.83 OTHER FATIGUE: ICD-10-CM

## 2024-07-30 DIAGNOSIS — E55.9 VITAMIN D DEFICIENCY: ICD-10-CM

## 2024-07-30 DIAGNOSIS — I10 ESSENTIAL HYPERTENSION: ICD-10-CM

## 2024-07-30 DIAGNOSIS — E66.01 MORBID (SEVERE) OBESITY DUE TO EXCESS CALORIES: ICD-10-CM

## 2024-07-30 DIAGNOSIS — Z13.6 ENCOUNTER FOR SCREENING FOR CARDIOVASCULAR DISORDERS: ICD-10-CM

## 2024-07-30 DIAGNOSIS — R41.3 MEMORY LOSS: ICD-10-CM

## 2024-07-30 DIAGNOSIS — E78.2 MIXED HYPERLIPIDEMIA: Primary | ICD-10-CM

## 2024-07-30 LAB
BILIRUB BLD-MCNC: NEGATIVE MG/DL
CLARITY, POC: CLEAR
COLOR UR: YELLOW
GLUCOSE UR STRIP-MCNC: NEGATIVE MG/DL
KETONES UR QL: NEGATIVE
LEUKOCYTE EST, POC: NEGATIVE
NITRITE UR-MCNC: NEGATIVE MG/ML
PH UR: 6 [PH] (ref 5–8)
PROT UR STRIP-MCNC: NEGATIVE MG/DL
RBC # UR STRIP: NEGATIVE /UL
SP GR UR: 1.01 (ref 1–1.03)
UROBILINOGEN UR QL: NORMAL

## 2024-07-30 RX ORDER — PHENTERMINE HYDROCHLORIDE 37.5 MG/1
37.5 CAPSULE ORAL EVERY MORNING
Qty: 30 CAPSULE | Refills: 0 | Status: SHIPPED | OUTPATIENT
Start: 2024-07-30

## 2024-07-30 NOTE — PATIENT INSTRUCTIONS
Advance Care Planning and Advance Directives     You make decisions on a daily basis - decisions about where you want to live, your career, your home, your life. Perhaps one of the most important decisions you face is your choice for future medical care. Take time to talk with your family and your healthcare team and start planning today.  Advance Care Planning is a process that can help you:  Understand possible future healthcare decisions in light of your own experiences  Reflect on those decision in light of your goals and values  Discuss your decisions with those closest to you and the healthcare professionals that care for you  Make a plan by creating a document that reflects your wishes    Surrogate Decision Maker  In the event of a medical emergency, which has left you unable to communicate or to make your own decisions, you would need someone to make decisions for you.  It is important to discuss your preferences for medical treatment with this person while you are in good health.     Qualities of a surrogate decision maker:  Willing to take on this role and responsibility  Knows what you want for future medical care  Willing to follow your wishes even if they don't agree with them  Able to make difficult medical decisions under stressful circumstances    Advance Directives  These are legal documents you can create that will guide your healthcare team and decision maker(s) when needed. These documents can be stored in the electronic medical record.    Living Will - a legal document to guide your care if you have a terminal condition or a serious illness and are unable to communicate. States vary by statute in document names/types, but most forms may include one or more of the following:        -  Directions regarding life-prolonging treatments        -  Directions regarding artificially provided nutrition/hydration        -  Choosing a healthcare decision maker        -  Direction regarding organ/tissue  donation    Durable Power of  for Healthcare - this document names an -in-fact to make medical decisions for you, but it may also allow this person to make personal and financial decisions for you. Please seek the advice of an  if you need this type of document.    **Advance Directives are not required and no one may discriminate against you if you do not sign one.    Medical Orders  Many states allow specific forms/orders signed by your physician to record your wishes for medical treatment in your current state of health. This form, signed in personal communication with your physician, addresses resuscitation and other medical interventions that you may or may not want.      For more information or to schedule a time with a [facility] Advance Care Planning Facilitator contact: [facility] or call [phone] and someone will contact you directly.    Medicare Wellness  Personal Prevention Plan of Service     Date of Office Visit:    Encounter Provider:  Dmitriy Awad MD  Place of Service:  [facility]  Patient Name: Ronit Robbins  :  1960    As part of the Medicare Wellness portion of your visit today, we are providing you with this personalized preventive plan of services (PPPS). This plan is based upon recommendations of the [place] Preventive Services Task Force (USPSTF) and the Advisory Committee on Immunization Practices (ACIP).    This lists the preventive care services that should be considered, and provides dates of when you are due. Items listed as completed are up-to-date and do not require any further intervention.    Health Maintenance   Topic Date Due   • ANNUAL WELLNESS VISIT  Never done   • COVID-19 Vaccine (2023- season) 2024   • Pneumococcal Vaccine 65+ (2 of 2 - PCV) 2025 (Originally 2024)   • INFLUENZA VACCINE  2024   • DXA SCAN  2025   • MAMMOGRAM  2025   • BMI  FOLLOWUP  06/14/2025   • LIPID PANEL  07/26/2025   • COLORECTAL CANCER SCREENING  09/08/2025   • TDAP/TD VACCINES (2 - Td or Tdap) 05/04/2028   • HEPATITIS C SCREENING  Completed   • ZOSTER VACCINE  Completed   • PAP SMEAR  Discontinued       Orders Placed This Encounter   Procedures   • ECG 12 Lead     Order Specific Question:   Reason for Exam:     Answer:   Screen for Ischemic Heart Disease, Welcome to Medicare Exam     Order Specific Question:   Release to patient     Answer:   Routine Release [[id number]]         No follow-ups on file.

## 2024-07-30 NOTE — PROGRESS NOTES
Subjective   The ABCs of the Annual Wellness Visit  Medicare Wellness Visit      Ronit Robbins is a 64 y.o. patient who presents for a Medicare Wellness Visit.    The following portions of the patient's history were reviewed and   updated as appropriate: allergies, current medications, past family history, past medical history, past social history, past surgical history, and problem list.    Compared to one year ago, the patient's physical   health is the same.  Compared to one year ago, the patient's mental   health is the same.    Recent Hospitalizations:  She was not admitted to the hospital during the last year.     Current Medical Providers:  Patient Care Team:  Dmitriy Awad MD as PCP - General (Family Medicine)  James Coley MD as Surgeon (Orthopedic Surgery)    Outpatient Medications Prior to Visit   Medication Sig Dispense Refill    atorvastatin (LIPITOR) 20 MG tablet Take 1 tablet by mouth Every Night. 90 tablet 0    cyclobenzaprine (FLEXERIL) 10 MG tablet Take 1 tablet by mouth 2 (Two) Times a Day As Needed for Muscle Spasms. 180 tablet 1    estradiol (ESTRACE VAGINAL) 0.1 MG/GM vaginal cream Insert 2 g into the vagina daily for 1 week, then insert 1 g vaginally twice weekly thereafter. 42.5 g 12    fluticasone (FLONASE) 50 MCG/ACT nasal spray Instill 2 sprays into each nostril as directed by provider Daily. 48 g 1    gabapentin (NEURONTIN) 300 MG capsule Take 1 capsule by mouth 3 (Three) Times a Day. (Patient taking differently: Take 1 capsule by mouth 3 (Three) Times a Day As Needed.) 30 capsule 0    indapamide (LOZOL) 1.25 MG tablet Take 1 tablet by mouth Every Morning. 90 tablet 3    lisinopril (PRINIVIL,ZESTRIL) 10 MG tablet Take 1 tablet by mouth 2 (Two) Times a Day. 180 tablet 3    meclizine (ANTIVERT) 25 MG tablet Take 1 tablet by mouth 3 (Three) Times a Day As Needed for Dizziness. 90 tablet 3    metFORMIN (GLUCOPHAGE) 500 MG tablet Take 1 tablet by mouth 2 (Two) Times a Day  "With Meals. (Patient taking differently: Take 1 tablet by mouth Every Night.) 180 tablet 2    methocarbamol (ROBAXIN) 500 MG tablet Take 1 tablet by mouth 2 (Two) Times a Day. 180 tablet 2    omeprazole (priLOSEC) 20 MG capsule Take 1 capsule by mouth Daily. 90 capsule 3    ondansetron (ZOFRAN) 4 MG tablet Take 1 tablet by mouth Every 8 (Eight) Hours As Needed for Nausea or Vomiting. 10 tablet 2    phentermine 15 MG capsule Take 1 capsule by mouth Every Morning. 30 capsule 0    Stannous Fluoride (Gel-Justo) 0.4 % gel Take as directed 244 g 5    phentermine 37.5 MG capsule Take 1 capsule by mouth Every Morning. 30 capsule 0    Tirzepatide-Weight Management (ZEPBOUND) 2.5 MG/0.5ML solution auto-injector Inject 0.5 mL under the skin into the appropriate area as directed 1 (One) Time Per Week. 2 mL 0     No facility-administered medications prior to visit.     No opioid medication identified on active medication list. I have reviewed chart for other potential  high risk medication/s and harmful drug interactions in the elderly.      Aspirin is not on active medication list.  Aspirin use is not indicated based on review of current medical condition/s. Risk of harm outweighs potential benefits.  .    Patient Active Problem List   Diagnosis    Idiopathic scoliosis    Essential hypertension    Mixed hyperlipidemia    Postmenopausal atrophic vaginitis    Overweight     Advance Care Planning (Click this link to access ACP Navigator)  Advance Directive is not on file.  ACP discussion was declined by the patient. Patient does not have an advance directive, declines further assistance.        Objective   Vitals:    07/30/24 1117   BP: 122/72   BP Location: Left arm   Patient Position: Sitting   Cuff Size: Adult   Pulse: 92   Resp: 16   SpO2: 98%   Weight: 69.6 kg (153 lb 6.4 oz)   Height: 154.9 cm (61\")   PainSc: 0-No pain       Estimated body mass index is 28.98 kg/m² as calculated from the following:    Height as of this " "encounter: 154.9 cm (61\").    Weight as of this encounter: 69.6 kg (153 lb 6.4 oz).             Jump to [facility] Fall Risk Flowsheet  Gait and Balance Evaluation:  Normal    Does the patient have evidence of cognitive impairment? No  Lab Results   Component Value Date    CHLPL 169 2024    TRIG 64 2024    HDL 71 2024    LDL 86 2024    VLDL 12 2024    HGBA1C 5.8 (H) 2024                                                                                                Health  Risk Assessment    Smoking Status:  Social History     Tobacco Use   Smoking Status Never   Smokeless Tobacco Never     Alcohol Consumption:  Social History     Substance and Sexual Activity   Alcohol Use Yes    Comment: occ     Fall Risk Screen:  [facility] Fall Risk Assessment was completed, and patient is at LOW risk for falls.Assessment completed on:2024    Depression Screenin/30/2024    11:21 AM   PHQ-2/PHQ-9 Depression Screening   Little Interest or Pleasure in Doing Things 0-->not at all   Feeling Down, Depressed or Hopeless 0-->not at all   PHQ-9: Brief Depression Severity Measure Score 0     Health Habits and Functional and Cognitive Screenin/30/2024    11:22 AM   Functional & Cognitive Status   Do you have difficulty preparing food and eating? No   Do you have difficulty bathing yourself, getting dressed or grooming yourself? No   Do you have difficulty using the toilet? No   Do you have difficulty moving around from place to place? No   Do you have trouble with steps or getting out of a bed or a chair? No   Current Diet Well Balanced Diet   Dental Exam Up to date   Eye Exam Up to date   Exercise (times per week) 7 times per week   Current Exercises Include Walking;Stationary Bicycling/Spin Class   Do you need help using the phone?  No   Are you deaf or do you have serious difficulty hearing?  No   Do you need help to go to places out of walking distance? No   Do you need help shopping? " No   Do you need help preparing meals?  No   Do you need help with housework?  No   Do you need help with laundry? No   Do you need help taking your medications? No   Do you need help managing money? No   Do you ever drive or ride in a car without wearing a seat belt? No   Have you felt unusual stress, anger or loneliness in the last month? No   Who do you live with? Spouse   If you need help, do you have trouble finding someone available to you? No   Have you been bothered in the last four weeks by sexual problems? No   Do you have difficulty concentrating, remembering or making decisions? No   Visual Acuity:  Vision Screening    Right eye Left eye Both eyes   Without correction      With correction 20/40 20/40 20/40       Age-appropriate Screening Schedule:  Refer to the list below for future screening recommendations based on patient's age, sex and/or medical conditions. Orders for these recommended tests are listed in the plan section. The patient has been provided with a written plan.    Health Maintenance List  Health Maintenance   Topic Date Due    COVID-19 Vaccine (6 - 2023-24 season) 10/19/2024 (Originally 3/7/2024)    Pneumococcal Vaccine 65+ (2 of 2 - PCV) 06/13/2025 (Originally 2/19/2024)    INFLUENZA VACCINE  08/01/2024    DXA SCAN  03/17/2025    MAMMOGRAM  04/18/2025    BMI FOLLOWUP  06/14/2025    LIPID PANEL  07/26/2025    ANNUAL WELLNESS VISIT  07/30/2025    COLORECTAL CANCER SCREENING  09/08/2025    TDAP/TD VACCINES (2 - Td or Tdap) 05/04/2028    HEPATITIS C SCREENING  Completed    ZOSTER VACCINE  Completed    PAP SMEAR  Discontinued                                                                                                                                                CMS Preventative Services Quick Reference  Risk Factors Identified During Encounter  Fall Risk-High or Moderate: Information on Fall Prevention Shared in After Visit Summary    The above risks/problems have been discussed with  "the patient.  Pertinent information has been shared with the patient in the After Visit Summary.  An After Visit Summary and PPPS were made available to the patient.    Follow Up:   Next Medicare Wellness visit to be scheduled in 1 year.         Additional E&M Note during same encounter follows:  Patient has additional, significant, and separately identifiable condition(s)/problem(s) that require work above and beyond the Medicare Wellness Visit     Chief Complaint  Welcome To Medicare (Fasting)    Subjective   64-year-old patient welcome to Medicare      Ronit is also being seen today for an annual adult preventative physical exam.     Review of Systems   Respiratory:  Negative for shortness of breath.    Cardiovascular:  Negative for chest pain and leg swelling.   Gastrointestinal:         Colonoscopy 2017   Endocrine: Negative for polydipsia, polyphagia and polyuria.   Musculoskeletal:  Positive for arthralgias.   All other systems reviewed and are negative.             Objective   Vital Signs:  /72 (BP Location: Left arm, Patient Position: Sitting, Cuff Size: Adult)   Pulse 92   Resp 16   Ht 154.9 cm (61\")   Wt 69.6 kg (153 lb 6.4 oz)   SpO2 98%   BMI 28.98 kg/m²   Physical Exam  Vitals and nursing note reviewed.   Constitutional:       Appearance: Normal appearance.   HENT:      Right Ear: Tympanic membrane and ear canal normal.      Left Ear: Tympanic membrane and ear canal normal.      Mouth/Throat:      Mouth: Mucous membranes are moist.   Eyes:      Extraocular Movements: Extraocular movements intact.      Pupils: Pupils are equal, round, and reactive to light.   Neck:      Vascular: No carotid bruit.   Cardiovascular:      Rate and Rhythm: Normal rate and regular rhythm.      Pulses: Normal pulses.      Heart sounds: Normal heart sounds.   Pulmonary:      Effort: Pulmonary effort is normal.      Breath sounds: Normal breath sounds.      Comments: Breast no masses noted  Abdominal:      General: " Abdomen is flat.      Palpations: Abdomen is soft. There is no mass.   Genitourinary:     Comments: Surgical past  Musculoskeletal:      Right lower leg: No edema.      Left lower leg: No edema.   Lymphadenopathy:      Cervical: No cervical adenopathy.   Skin:     General: Skin is warm and dry.   Neurological:      General: No focal deficit present.      Mental Status: She is alert and oriented to person, place, and time.   Psychiatric:         Mood and Affect: Mood normal.         Behavior: Behavior normal.         Thought Content: Thought content normal.         Judgment: Judgment normal.                 Assessment and Plan               Mixed hyperlipidemia     Hyperglycemia    Other fatigue    Vitamin D deficiency    Memory loss    Essential hypertension    Screening mammogram for breast cancer    Medicare welcome exam    Encounter for screening for cardiovascular disorders    Screening for colorectal cancer      Orders Placed This Encounter   Procedures    Mammo Screening Digital Tomosynthesis Bilateral With CAD     Standing Status:   Future     Standing Expiration Date:   7/30/2025     Order Specific Question:   Reason for Exam:     Answer:   Screening     Order Specific Question:   Release to patient     Answer:   Routine Release [[id number]]    Cologuard - Stool, Per Rectum     Standing Status:   Future     Standing Expiration Date:   7/30/2025     Order Specific Question:   Release to patient     Answer:   Routine Release [[id number]]    POC Urinalysis Dipstick, Multipro     Order Specific Question:   Release to patient     Answer:   Routine Release [[id number]]    ECG 12 Lead     Order Specific Question:   Reason for Exam:     Answer:   Hyperlipidemia     Order Specific Question:   Release to patient     Answer:   Routine Release [[id number]]   Plan above-EKG normal-none for comparison          Follow Up   Return in about 6 months (around 1/30/2025), or if symptoms worsen or fail to improve.  Patient was  given instructions and counseling regarding her condition or for health maintenance advice. Please see specific information pulled into the AVS if appropriate.

## 2024-07-30 NOTE — PROGRESS NOTES
July 30, 2024    Hello, may I speak with Ronit Robbins?    My name is Roxanne      I am  with [facility]  [address], [address]  [place] [address] [phone].    Before we get started may I verify your date of birth? 1960    I am calling to officially welcome you to our practice and ask about your recent visit. Is this a good time to talk? Yes    Tell me about your visit with us. What things went well?  Everything went well!       We're always looking for ways to make our patients' experiences even better. Do you have recommendations on ways we may improve? none     Overall were you satisfied with your first visit to our practice? yes       I appreciate you taking the time to speak with me today. Is there anything else I can do for you? no      Thank you, and have a great day.

## 2024-08-19 DIAGNOSIS — E78.2 MIXED HYPERLIPIDEMIA: ICD-10-CM

## 2024-08-19 DIAGNOSIS — G62.9 NEUROPATHY: ICD-10-CM

## 2024-08-19 RX ORDER — ATORVASTATIN CALCIUM 20 MG/1
20 TABLET, FILM COATED ORAL NIGHTLY
Qty: 90 TABLET | Refills: 0 | Status: SHIPPED | OUTPATIENT
Start: 2024-08-19 | End: 2024-08-23

## 2024-08-19 RX ORDER — GABAPENTIN 300 MG/1
300 CAPSULE ORAL 3 TIMES DAILY
Qty: 30 CAPSULE | Refills: 0 | Status: SHIPPED | OUTPATIENT
Start: 2024-08-19

## 2024-08-19 NOTE — TELEPHONE ENCOUNTER
Rx Refill Note  Requested Prescriptions     Pending Prescriptions Disp Refills    atorvastatin (LIPITOR) 20 MG tablet 90 tablet 0     Sig: Take 1 tablet by mouth Every Night.    gabapentin (NEURONTIN) 300 MG capsule 30 capsule 0     Sig: Take 1 capsule by mouth 3 (Three) Times a Day.      Last office visit with prescribing clinician: 7/30/2024   Last telemedicine visit with prescribing clinician: Visit date not found   Next office visit with prescribing clinician: Visit date not found                         Would you like a call back once the refill request has been completed: [] Yes [] No    If the office needs to give you a call back, can they leave a voicemail: [] Yes [] No    Loren Khan, PCT  08/19/24, 11:34 CDT

## 2024-08-19 NOTE — TELEPHONE ENCOUNTER
Pravastatin 20 mg-  has a whole bottle left and wants to see if it's okay if she finishes that first or if she needs to discard and go right to the Lipitor?

## 2024-08-23 DIAGNOSIS — E78.2 MIXED HYPERLIPIDEMIA: ICD-10-CM

## 2024-08-23 RX ORDER — ATORVASTATIN CALCIUM 20 MG/1
20 TABLET, FILM COATED ORAL NIGHTLY
Qty: 90 TABLET | Refills: 3 | Status: SHIPPED | OUTPATIENT
Start: 2024-08-23

## 2024-08-23 NOTE — TELEPHONE ENCOUNTER
Rx Refill Note  Requested Prescriptions     Pending Prescriptions Disp Refills    atorvastatin (LIPITOR) 20 MG tablet [Pharmacy Med Name: Atorvastatin Calcium 20 MG Oral Tablet (LIPITOR)] 90 tablet 3     Sig: Take 1 tablet by mouth Every Night.      Last office visit with prescribing clinician: 7/30/2024   Last telemedicine visit with prescribing clinician: Visit date not found   Next office visit with prescribing clinician:     {TIP  Please add Last Relevant Lab 7/26/24    Mago Bal MA  08/23/24, 08:48 CDT

## 2024-09-05 DIAGNOSIS — I10 ESSENTIAL HYPERTENSION: ICD-10-CM

## 2024-09-05 RX ORDER — INDAPAMIDE 1.25 MG/1
1.25 TABLET ORAL EVERY MORNING
Qty: 90 TABLET | Refills: 3 | Status: SHIPPED | OUTPATIENT
Start: 2024-09-05

## 2024-09-05 RX ORDER — LISINOPRIL 10 MG/1
10 TABLET ORAL 2 TIMES DAILY
Qty: 180 TABLET | Refills: 3 | Status: SHIPPED | OUTPATIENT
Start: 2024-09-05

## 2024-09-05 NOTE — TELEPHONE ENCOUNTER
Rx Refill Note  Requested Prescriptions     Pending Prescriptions Disp Refills    lisinopril (PRINIVIL,ZESTRIL) 10 MG tablet 180 tablet 3     Sig: Take 1 tablet by mouth 2 (Two) Times a Day.    indapamide (LOZOL) 1.25 MG tablet 90 tablet 3     Sig: Take 1 tablet by mouth Every Morning.      Last office visit with prescribing clinician: 7/30/2024   Last telemedicine visit with prescribing clinician: Visit date not found   Next office visit with prescribing clinician: Visit date not found                         Would you like a call back once the refill request has been completed: [] Yes [] No    If the office needs to give you a call back, can they leave a voicemail: [] Yes [] No    Rivera John Rep  09/05/24, 14:40 CDT

## 2024-09-26 DIAGNOSIS — G62.9 NEUROPATHY: ICD-10-CM

## 2024-09-26 RX ORDER — MECLIZINE HYDROCHLORIDE 25 MG/1
25 TABLET ORAL 3 TIMES DAILY PRN
Qty: 90 TABLET | Refills: 3 | Status: SHIPPED | OUTPATIENT
Start: 2024-09-26

## 2024-09-26 RX ORDER — ESTRADIOL 0.1 MG/G
2 CREAM VAGINAL DAILY
Qty: 42.5 G | Refills: 12 | Status: SHIPPED | OUTPATIENT
Start: 2024-09-26

## 2024-09-26 RX ORDER — GABAPENTIN 300 MG/1
300 CAPSULE ORAL 3 TIMES DAILY
Qty: 30 CAPSULE | Refills: 2 | Status: SHIPPED | OUTPATIENT
Start: 2024-09-26

## 2024-11-07 ENCOUNTER — FLU SHOT (OUTPATIENT)
Dept: FAMILY MEDICINE CLINIC | Facility: CLINIC | Age: 64
End: 2024-11-07
Payer: MEDICARE

## 2024-11-07 DIAGNOSIS — Z23 NEED FOR INFLUENZA VACCINATION: Primary | ICD-10-CM

## 2024-11-07 PROCEDURE — 90662 IIV NO PRSV INCREASED AG IM: CPT | Performed by: FAMILY MEDICINE

## 2024-11-07 PROCEDURE — G0008 ADMIN INFLUENZA VIRUS VAC: HCPCS | Performed by: FAMILY MEDICINE

## 2024-11-13 DIAGNOSIS — R73.9 HYPERGLYCEMIA: ICD-10-CM

## 2024-11-13 NOTE — TELEPHONE ENCOUNTER
Rx Refill Note  Requested Prescriptions     Pending Prescriptions Disp Refills    metFORMIN (GLUCOPHAGE) 500 MG tablet 90 tablet 3     Sig: Take 1 tablet by mouth Every Night.      Last office visit with prescribing clinician: 7/30/2024   Last telemedicine visit with prescribing clinician: Visit date not found   Next office visit with prescribing clinician: Visit date not found                         Would you like a call back once the refill request has been completed: [] Yes [] No    If the office needs to give you a call back, can they leave a voicemail: [] Yes [] No    Rivera John Rep  11/13/24, 10:17 CST

## 2024-11-23 DIAGNOSIS — E78.2 MIXED HYPERLIPIDEMIA: ICD-10-CM

## 2024-11-25 RX ORDER — ATORVASTATIN CALCIUM 20 MG/1
20 TABLET, FILM COATED ORAL NIGHTLY
Qty: 90 TABLET | Refills: 2 | Status: SHIPPED | OUTPATIENT
Start: 2024-11-25

## 2024-11-25 NOTE — TELEPHONE ENCOUNTER
Rx Refill Note  Requested Prescriptions     Pending Prescriptions Disp Refills    atorvastatin (LIPITOR) 20 MG tablet [Pharmacy Med Name: ATORVASTATIN 20MG] 90 tablet 0     Sig: TAKE 1 TABLET BY MOUTH EVERY NIGHT.      Last office visit with prescribing clinician: 7/30/2024   Last telemedicine visit with prescribing clinician: Visit date not found   Next office visit with prescribing clinician:     {TIP  Please add Last Relevant Lab 7/26/24    Mago Bal MA  11/25/24, 07:45 CST

## 2024-12-26 DIAGNOSIS — I10 ESSENTIAL HYPERTENSION: ICD-10-CM

## 2024-12-26 RX ORDER — INDAPAMIDE 1.25 MG/1
1.25 TABLET ORAL EVERY MORNING
Qty: 90 TABLET | Refills: 1 | Status: SHIPPED | OUTPATIENT
Start: 2024-12-26

## 2024-12-26 RX ORDER — LISINOPRIL 10 MG/1
10 TABLET ORAL 2 TIMES DAILY
Qty: 180 TABLET | Refills: 1 | Status: SHIPPED | OUTPATIENT
Start: 2024-12-26

## 2024-12-26 NOTE — TELEPHONE ENCOUNTER
Rx Refill Note  Requested Prescriptions     Pending Prescriptions Disp Refills    indapamide (LOZOL) 1.25 MG tablet 90 tablet 3     Sig: Take 1 tablet by mouth Every Morning.    lisinopril (PRINIVIL,ZESTRIL) 10 MG tablet 180 tablet 3     Sig: Take 1 tablet by mouth 2 (Two) Times a Day.      Last office visit with prescribing clinician: 7/30/2024   Last telemedicine visit with prescribing clinician: Visit date not found   Next office visit with prescribing clinician: Visit date not found                         Would you like a call back once the refill request has been completed: [] Yes [] No    If the office needs to give you a call back, can they leave a voicemail: [] Yes [] No    Mago Geiger MA  12/26/24, 09:18 CST

## 2025-01-15 DIAGNOSIS — R73.9 HYPERGLYCEMIA: ICD-10-CM

## 2025-01-15 DIAGNOSIS — E78.2 MIXED HYPERLIPIDEMIA: ICD-10-CM

## 2025-01-15 DIAGNOSIS — M79.18 MUSCLE PAIN, LUMBAR: ICD-10-CM

## 2025-01-15 RX ORDER — METHOCARBAMOL 500 MG/1
500 TABLET, FILM COATED ORAL 2 TIMES DAILY
Qty: 180 TABLET | Refills: 2 | Status: SHIPPED | OUTPATIENT
Start: 2025-01-15

## 2025-01-15 RX ORDER — ATORVASTATIN CALCIUM 20 MG/1
20 TABLET, FILM COATED ORAL NIGHTLY
Qty: 90 TABLET | Refills: 2 | Status: SHIPPED | OUTPATIENT
Start: 2025-01-15

## 2025-01-15 NOTE — TELEPHONE ENCOUNTER
Rx Refill Note  Requested Prescriptions     Pending Prescriptions Disp Refills    atorvastatin (LIPITOR) 20 MG tablet 90 tablet 2     Sig: Take 1 tablet by mouth Every Night.    metFORMIN (GLUCOPHAGE) 500 MG tablet 90 tablet 2     Sig: Take 1 tablet by mouth Every Night.    methocarbamol (ROBAXIN) 500 MG tablet 180 tablet 2     Sig: Take 1 tablet by mouth 2 (Two) Times a Day.      Last office visit with prescribing clinician: 7/30/2024   Last telemedicine visit with prescribing clinician: Visit date not found   Next office visit with prescribing clinician: Visit date not found                         Would you like a call back once the refill request has been completed: [] Yes [] No    If the office needs to give you a call back, can they leave a voicemail: [] Yes [] No    Rivera John Rep  01/15/25, 14:59 CST

## 2025-01-16 DIAGNOSIS — G62.9 NEUROPATHY: ICD-10-CM

## 2025-01-16 RX ORDER — CYCLOBENZAPRINE HCL 10 MG
10 TABLET ORAL 2 TIMES DAILY PRN
Qty: 180 TABLET | Refills: 1 | Status: SHIPPED | OUTPATIENT
Start: 2025-01-16

## 2025-01-16 NOTE — TELEPHONE ENCOUNTER
Rx Refill Note  Requested Prescriptions     Pending Prescriptions Disp Refills    cyclobenzaprine (FLEXERIL) 10 MG tablet 180 tablet 1     Sig: Take 1 tablet by mouth 2 (Two) Times a Day As Needed for Muscle Spasms.          Last office visit with prescribing clinician: 7/30/2024   Last telemedicine visit with prescribing clinician: Visit date not found   Next office visit with prescribing clinician: Visit date not found                         Would you like a call back once the refill request has been completed: [] Yes [] No    If the office needs to give you a call back, can they leave a voicemail: [] Yes [] No    Sandy Campos MA  01/16/25, 15:12 CST

## 2025-01-21 DIAGNOSIS — I10 ESSENTIAL HYPERTENSION: ICD-10-CM

## 2025-01-21 RX ORDER — LISINOPRIL 10 MG/1
10 TABLET ORAL 2 TIMES DAILY
Qty: 180 TABLET | Refills: 2 | Status: SHIPPED | OUTPATIENT
Start: 2025-01-21

## 2025-01-21 RX ORDER — INDAPAMIDE 1.25 MG/1
1.25 TABLET ORAL EVERY MORNING
Qty: 90 TABLET | Refills: 2 | Status: SHIPPED | OUTPATIENT
Start: 2025-01-21

## 2025-01-21 NOTE — TELEPHONE ENCOUNTER
Rx Refill Note  Requested Prescriptions     Pending Prescriptions Disp Refills    lisinopril (PRINIVIL,ZESTRIL) 10 MG tablet 180 tablet 2     Sig: Take 1 tablet by mouth 2 (Two) Times a Day.    omeprazole (priLOSEC) 20 MG capsule 90 capsule 2     Sig: Take 1 capsule by mouth Daily.    indapamide (LOZOL) 1.25 MG tablet 90 tablet 2     Sig: Take 1 tablet by mouth Every Morning.      Last office visit with prescribing clinician: 7/30/2024   Last telemedicine visit with prescribing clinician: Visit date not found   Next office visit with prescribing clinician: Visit date not found                         Would you like a call back once the refill request has been completed: [] Yes [] No    If the office needs to give you a call back, can they leave a voicemail: [] Yes [] No    Rivera John Rep  01/21/25, 09:23 CST

## 2025-01-23 DIAGNOSIS — G62.9 NEUROPATHY: ICD-10-CM

## 2025-01-23 RX ORDER — GABAPENTIN 300 MG/1
300 CAPSULE ORAL 3 TIMES DAILY
Qty: 30 CAPSULE | Refills: 2 | Status: CANCELLED | OUTPATIENT
Start: 2025-01-23

## 2025-01-23 NOTE — TELEPHONE ENCOUNTER
Pt called and says she does not need at this time. Pharmacy was not supposed to send this request--she will call later date.

## 2025-01-23 NOTE — TELEPHONE ENCOUNTER
Caller: [facility] - [place], [place] - [address] - [phone] [facility] [phone] FX    Relationship: Pharmacy    Best call back number: [phone]     Requested Prescriptions:   Requested Prescriptions     Pending Prescriptions Disp Refills    gabapentin (NEURONTIN) 300 MG capsule 30 capsule 2     Sig: Take 1 capsule by mouth 3 (Three) Times a Day.        Pharmacy where request should be sent: [facility] - [place], [place] - [address] - [phone]  - [phone] FX     Last office visit with prescribing clinician: 7/30/2024   Last telemedicine visit with prescribing clinician: Visit date not found   Next office visit with prescribing clinician: Visit date not found     Additional details provided by patient:     Does the patient have less than a 3 day supply:  [] Yes  [x] No    Would you like a call back once the refill request has been completed: [] Yes [] No    If the office needs to give you a call back, can they leave a voicemail: [] Yes [] No    Rivera Chicas Rep   01/23/25 08:35 CST

## 2025-05-06 ENCOUNTER — HOSPITAL ENCOUNTER (OUTPATIENT)
Dept: MAMMOGRAPHY | Facility: HOSPITAL | Age: 65
Discharge: HOME | End: 2025-05-06
Admitting: FAMILY MEDICINE
Payer: MEDICARE

## 2025-05-06 DIAGNOSIS — Z12.31 SCREENING MAMMOGRAM FOR BREAST CANCER: ICD-10-CM

## 2025-05-06 LAB
NCCN CRITERIA FLAG: NORMAL
TYRER CUZICK SCORE: 9.8

## 2025-05-06 PROCEDURE — 77063 BREAST TOMOSYNTHESIS BI: CPT

## 2025-05-06 PROCEDURE — 77067 SCR MAMMO BI INCL CAD: CPT

## 2025-06-05 NOTE — TELEPHONE ENCOUNTER
[facility]  Advanced Heart Failure, MCS, Transplant and Pulmonary Hypertension Cardiology  LVAD Progress Note    Date: 2025    Patient: Ben Murray Jr.   : 1963 MRN: [id number]   Admit Date: 2025 LOS: 4   PCP: Juan Long MD        SUBJECTIVE: Patient went to IR w/ administration of vitamin K. Afebrile, down trending WBC, and no acute events. Spoke to RN at bedside, rates of 50 with baseline 70s o/n and this AM, continue to monitor.     HPI: Mr. 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  On 2025, NP received a call from patients wife, Kay Murray, who noted symptomatic stoke symptoms, slurred speech, lack of mobility onset 25 o/n. Upon questioning, the patient noted only a dry mouth on 25, the next morning patients speech, and motor deficits had drastically declined. Wife states that patient has not taken his medications in a few days, has active depression, and that he has not eaten in a week.   Upon presentation to the [facility] ER via EMS, patient's c/o was worsening generalized weakness, and R sided facial droop. He was lethargic, in ST rate of 110s, hypertensive 123/93, discharge upon driveline, AMS, and somnolence. Patient was administered abx through [place] for potential infection with WBC of 21 and hx of driveline infection with possible resistance to Zosyn. In addition to a CT, conclusive of subarachnoid hemorrhage with possible intraparenchymal component, maintaining drowsy, generalized weakness, and lacking focal deficits. Heart failure consulted for VAD (3/2024) as DT, new acute SAH.     VAD KEY EVENTS:   2025: TTE being performed, R sided flaccid, , cannot determine R/L sided sensation,   6/3/2025:  error   New A-line, no changes   6/4/25: cEEG, ID consult for potential IR, safeline IV  6/5/25: IR undergoing, bradycardic events    REVIEW OF SYSTEMS:  Constitutional: Denies fever, chills, fatigue, myalgias, and weakness.   HEENT: Denies headache, vision changes. Denies nose bleeding and gingival bleeding.   Respiratory:  Denies SOB, TOMPKINS, orthopnea, PND, cough.   Cardiovascular:  Denies chest pain, palpitations. Denies pre-syncope, syncope, lightheadedness, dizziness. Denies LE edema.  Denies recent VAD alarms or ICD shocks.   GI:  Denies abdominal bloating, early satiety. Denies nausea, vomiting, diarrhea, constipation, abdominal pain. Denies bloody or black/tarry stools.   :  Denies dark colored urine, blood in urine. Denies urinary retention, pain with urination, urinary frequency, and nocturia.   Musculoskeletal:  Denies back pain, neck pain, joint pain.  Integument:  Denies redness, pain + drainage at driveline- 6/1/25 upon arrival exit site.   Neurologic:  + focal weakness- 6/1/25 Denies sensory changes.  Denies numbness and tingling.  Endocrine:  Denies polyuria, polydipsia or temperature intolerance.     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     Social History     Socioeconomic History    Marital status: /Civil Union     Spouse name: Not on file    Number of children: Not on file    Years of education: Not on file    Highest education level: Not on file   Occupational History    Not on file   Tobacco Use    Smoking status: Some Days     Current packs/day: 1.00     Average packs/day: 1 pack/day for 15.4 years (15.4 ttl pk-yrs)     Types: Cigarettes     Start date: 2010     Passive exposure: Current    Smokeless tobacco: Never   Vaping Use    Vaping status: never used   Substance and Sexual Activity    Alcohol use: Not Currently     Comment: PT. REPORTS HISTORY OF DRINKING PROBLEM    Drug use: Never    Sexual activity: Yes     Partners: Female   Other Topics Concern    Not on file   Social History Narrative    Not on file     Social Drivers of Health     Financial Resource Strain: Low Risk  (6/2/2025)    Financial Resource Strain     Unable to Get: None   Food Insecurity: Low Risk  (6/2/2025)    Food Insecurity     Worried about Food: Never true     Food is Gone: Never true   Transportation Needs: Not At Risk (6/2/2025)    Transportation Needs     Lack of Reliable Transportation: No   Physical Activity: Not on file   Stress: Medium Risk (4/24/2024)    Stress     How Stressed: Somewhat   Social Connections: Low Risk  (6/2/2025)    Social Connections     Social Connectivity: 5 or more times a week   Feeling safe in your relationship: Low Risk  (6/2/2025)    Interpersonal Safety     How often physically hurt: Never     How often insulted or talked down to: Never     How often threatened with harm: Never     How often scream or curse at: Never         OBJECTIVE:    Vitals with min/max:  Vital Last Value 24 Hour Range   Temperature 96.3 °F (35.7 °C) (06/05/25 0400) Temp  Min: 96.3 °F (35.7 °C)  Max: 98.3 °F (36.8 °C)   Pulse (!) 49 (06/05/25 0700) Pulse  Min: 49  Max: 78   Respiratory 17 (06/05/25 0700) Resp  Min: 11  Max: 27   Non-Invasive  Blood Pressure (!) 85/66 (06/03/25 2200) No data recorded   Pulse Oximetry 99 % (06/05/25 0700) SpO2  Min: 97 %  Max: 100 %   Arterial   Blood Pressure 103/84 (06/05/25 0700) Arterial Line BP  Min: 75/61  Max: 107/89           Weight    06/02/25 0000 06/03/25 0000 06/04/25 0000 06/05/25 0000   Weight: 100.1 kg (220 lb 10.9 oz) 103.7 kg (228 lb 9.9 oz) 108.1 kg (238 lb 5.1 oz) 110.7 kg (244 lb 0.8 oz)          Intake/Output Summary (Last 24 hours) at 6/5/2025 0831  Last data filed at 6/5/2025 0741  Gross per 24 hour   Intake 1313.74 ml   Output 705 ml   Net 608.74 ml             MEDICATIONS:    ALLERGIES:  No Known Allergies     Current Facility-Administered Medications   Medication Dose Route Frequency Provider Last Rate Last Admin    potassium CHLORIDE 20 mEq/100mL IVPB premix  20 mEq Intravenous Once Rocky Menendez CNP 50 mL/hr at 06/05/25 0700 Rate Verify at 06/05/25 0700    phytonadione (Vitamin K) 1 MG/ML oral suspension 2.5 mg  2.5 mg Oral Once iDane Delgadillo CNP        sodium chloride 0.9 % injection 10 mL  10 mL Injection 2 times per day Quan Sykes CNP   10 mL at 06/04/25 2100    atorvastatin (LIPITOR) tablet 40 mg  40 mg Oral Nightly Rocky Menendez CNP        lidocaine HCl (PF) (XYLOCAINE) 1 % injection 50 mg  5 mL Subcutaneous Once Rocky Menendez CNP        Vancomycin trough due 6/3 at 1730   Does not apply Once Lexie Davalos APNP        methIMAzole (TAPAZOLE) tablet 30 mg  30 mg Per NG Tube Daily John Meraz MD   30 mg at 06/04/25 8106    Magnesium Standard Replacement Protocol   Does not apply See Admin Instructions Isaias Suarez, KAREN        sodium chloride 0.9 % injection 2 mL   2 mL Intracatheter 2 times per day Denise Barajas MD   2 mL at 06/04/25 2059    meropenem (MERREM) 1 g in sodium chloride 0.9 % 100 mL IVPB  1 g Intravenous 3 times per day Lexie Davalos APNP   Completed at 06/05/25 0135    Vancomycin Pharmacy to Manage   Does not apply See Admin Instructions Lexie Davalos APNP        vancomycin 1,250 mg in sodium chloride 0.9% 250 mL IVPB (Ordered as: VANCOMYCIN IVPB (ORDERABLE))  1,250 mg Intravenous Daily Lexie Dvaalos APNP   Completed at 06/04/25 2104    hydroCORTisone (Solu-CORTEF) PF injection 50 mg  50 mg Intravenous 4 times per day Denise Barajas MD   50 mg at 06/05/25 0543    pantoprazole (PROTONIX INJECT) injection 40 mg  40 mg Intravenous Daily Lexie Davalos APNP   40 mg at 06/04/25 0937    levETIRAcetam (KepPRA INJECT) injection 1,000 mg  1,000 mg Intravenous 2 times per day Lexie Davalos APNP   1,000 mg at 06/04/25 2100        Current Facility-Administered Medications   Medication Dose Route Frequency Provider Last Rate Last Admin    sodium chloride 0.9 % injection 10 mL  10 mL Injection PRN Quan Sykes CNP        acetaminophen (TYLENOL) suppository 650 mg  650 mg Rectal Q6H PRN Lexie Davalos APNP        sodium chloride 0.9% infusion   Intravenous Continuous PRN Denise Barajas MD        sodium chloride (NORMAL SALINE) 0.9 % bolus 500 mL  500 mL Intravenous PRN Denise Barajas MD        sodium chloride 0.9 % injection 10 mL  10 mL Intravenous PRN Lexie Davalos APNP        sodium chloride 0.9% infusion   Intravenous Continuous PRN Lexie Davalos APNP        gentamicin (GARAMYCIN) 0.3 % ophthalmic solution 5 drop  5 drop Topical PRN Lexie Davalos APNP   5 drop at 06/05/25 0519        PHYSICAL EXAMINATION:  Constitutional:  Alert and Oriented. Pt in no acute distress.  Cardiovascular: Audible VAD Tones. No JVD, No HJR  Respiratory:  No respiratory distress. Clear to auscultation, no rales/rhonchi, no wheezing..   GI: Abdomen soft, non-tender and  non-distended. Bowel sounds normal.  Integumentary:  Warm. Dry. No rash. Sternum stable. [place] intact.   Extremities: BLE w/2+ edema. All extremities warm and well perfused.  Neurologic:  Alert & oriented x 3. Normal motor function. Normal sensory function. No focal deficits noted.   Psychiatric: Cooperative, appropriate mood and affect.    VAD INTERROGATION  Device Type: Heartmate III  Implant Date: 3/7/2024  Flow: 4 Pump Speed: 5600 PI: 4.1 Power: 4   Low Speed Settin HCT: 29         LABS, IMAGING, CARDIAC TESTING:  CMP and Magnesium:  Recent Labs   Lab 25  0407 25  0427 25  0236 25  0323 25  1545 25  1150 25  1210 25  0916 25  1400 24  1044 24  1445 24  1249 24  1340   Glucose 150* 151* 150*   < > 105* 124* 113*   < > 113*   < > 111*   < > 134*   Sodium 139 137 143   < > 142 140 136   < > 136   < > 140   < > 139   Potassium 3.9 4.1 4.1   < > 3.6 4.1 4.0   < > 4.60   < > 3.96   < > 3.80   Chloride 109 111* 117*   < > 110 110 109   < > 100.80   < > 105.22   < > 103.69   Carbon Dioxide 23 22 22   < > 22 23 23   < > 26.0   < > 24.6   < > 26.8   Anion Gap 11 8 8   < > 14 11 8   < >  --    < >  --    < >  --    BUN 36* 44* 47*   < > 36* 12 21*   < > 29*   < > 25   < > 19   Creatinine 1.01 1.25* 1.20*   < > 1.64* 1.12 1.25*   < > 1.60*   < > 1.23   < > 1.39   GFR Estimate,   --   --   --   --   --   --   --   --     GFR Estimate, Non   --   --   --   --   --   --   --   --    --     BUN/Creatinine Ratio  --   --   --   --   --   --   --   --  18.13  --  20.33  --  13.67   CALCIUM  --   --   --   --   --   --   --   --  10.0  --  10.3  --  9.9   Calcium 8.8 8.6 8.5   < > 8.7 9.7 9.4   < >  --    < >  --    < >  --    TOTAL BILIRUBIN  --   --   --   --   --   --   --   --  0.4  --  0.4  --  0.8   Bilirubin, Total  --  0.5 0.5  --  0.9 0.5 0.2   < >  --    < >  --    < >  --     AST/SGOT  --   --   --   --   --   --   --   --  15  --  15  --  13   GOT/AST  --  41* 53*  --  51* 18 14   < >  --    < >  --    < >  --    ALT/SGPT  --   --   --   --   --   --   --   --  19  --  31  --  21   GPT  --  17 17  --  16 13 23   < >  --    < >  --    < >  --    ALK PHOSPHATASE  --   --   --   --   --   --   --   --  72  --  62  --  58   Alkaline Phosphatase  --  84 88  --  184* 88 65   < >  --    < >  --    < >  --    TOTAL PROTEIN  --   --   --   --   --   --   --   --  6.46  --  7.43  --  6.58   Protein, Total  --  6.7 6.7  --  7.6 7.5 7.7   < >  --    < >  --    < >  --    Albumin  --  1.7* 1.7*  --  2.0* 2.6* 2.9*   < > 3.80   < > 3.59   < > 3.38*   GLOBULIN  --   --   --   --   --   --   --   --  2.7  --  3.8  --  3.2   Globulin  --   --   --   --  5.6* 4.9* 4.8*   < >  --    < >  --    < >  --    A/G Ratio, Serum  --   --   --   --   --   --   --   --  1.4  --  0.9*  --  1.1   A/G Ratio  --   --   --   --  0.4* 0.5* 0.6*   < >  --    < >  --    < >  --    MAGNESIUM  --   --   --   --   --   --   --   --  1.6*  --  2.0  --  2.0   Magnesium 2.7* 2.8* 2.9*   < > 2.3 2.0 2.3   < >  --    < >  --    < >  --     < > = values in this interval not displayed.       CBC:  Recent Labs   Lab 06/05/25  0407 06/04/25  0427 06/03/25  0236 06/02/25  0323 06/01/25  2116 06/01/25  1545 04/21/25  1150   WBC 17.5* 19.6* 18.2*   < > 20.8* 21.0* 16.1*   RBC 4.26* 4.39* 4.04*   < > 4.14* 4.76 4.90   HGB 8.9* 9.1* 8.7*   < > 8.9* 10.0* 10.5*   HCT 28.5* 28.8* 25.4*   < > 26.4* 31.6* 35.7*   MCV 66.9* 65.6* 62.9*   < > 63.8* 66.4* 72.9*   MCH 20.9* 20.7* 21.5*   < > 21.5* 21.0* 21.4*   MCHC 31.2* 31.6* 34.3   < > 33.7 31.6* 29.4*   RDW-CV 18.1* 18.3* 17.6*   < > 17.3* 17.5* 17.6*   RDW-SD 41.8 41.1 38.5*   < > 38.8* 39.7 45.3    217 213   < > 213 231 372   NRBC 0 0 0   < > 0 0 0   Neutrophil, Percent  --   --   --   --  89 91 69   Lymphocytes, Percent  --   --   --   --  6 4 21   Mono, Percent  --   --   --   --   4 4 8   Eosinophils, Percent  --   --   --   --  0 0 1   Basophils, Percent  --   --   --   --  0 0 0   Immature Granulocytes  --   --   --   --  1 1 1   Absolute Neutrophils  --   --   --   --  18.5* 18.9* 11.1*   Absolute Lymphocytes  --   --   --   --  1.2 0.9* 3.3   Absolute Monocytes  --   --   --   --  0.9 0.9 1.3*   Absolute Eosinophils   --   --   --   --  0.0 0.0 0.1   Absolute Basophils  --   --   --   --  0.0 0.0 0.1   Absolute Immature Granulocytes  --   --   --   --  0.2 0.2 0.2    < > = values in this interval not displayed.       INR:  Recent Labs   Lab 06/05/25 0407 06/04/25 0427 06/03/25  0236 06/02/25  0323 06/01/25  2116 06/01/25  1821 12/19/24  0515 12/18/24  2154   INR 1.8 1.4 1.1   < > 10.1* 14.0*   < > 1.7   INR-POC  --   --   --   --   --   --    < >  --    PROTIME-PT  --   --   --   --   --   --    < >  --    Protime- PT 19.4* 14.6* 12.3*   < > 92.6* 125.2*   < > 18.2*   PTT  --   --   --   --  62* 57*  --  39*    < > = values in this interval not displayed.       LDH:  Recent Labs   Lab 06/05/25 0407 06/04/25 0427 06/03/25  0236   LD, Total 242* 189 190       Cultures:  Drive Line Culture & Bacterial, Blood Cultures:    Recent Labs   Lab 06/04/25  1731 06/04/25  1057 06/02/25  0001 06/01/25  1803 06/01/25  1747 12/19/24  1909 12/18/24  1249 12/05/24  1400   AANC  --   --  Culture in progress.  Few Pseudomonas aeruginosa*  --   --  Rare Pseudomonas aeruginosa*  Very rare Staphylococcus coagulase negative*  --  Moderate Pseudomonas aeruginosa*   Culture, Blood or Bone Marrow No Growth <24 hours No Growth <24 hours  --  Culture in progress.  Pseudomonas aeruginosa* Culture in progress.  Pseudomonas aeruginosa*  --    < >  --    Gram Stain  --   --  No polymorphonuclear cells seen.  No epithelial cells seen.  No organisms seen. Gram negative bacilli.* Gram negative bacilli.* No polymorphonuclear cells seen.  Very Rare Epithelial cells.  No organisms seen.  --  No polymorphonuclear  cells seen.  No epithelial cells seen.  Rare Gram negative bacilli.    < > = values in this interval not displayed.       ECG:   No results found for this or any previous visit.      CXR:   Results for orders placed or performed during the hospital encounter of 06/01/25   XR CHEST AP OR PA    Narrative    EXAM: XR CHEST AP OR PA  COMPARISON: Chest x-ray June 2, 2025  TECHNIQUE:Portable upright chest  CLINICAL INFORMATION: Cardiopulmonary Evaluation. 5' 9\" ft 228.62 lb  ADDITIONAL HISTORY:  Subarachnoid bleed  (CMD)  Supratherapeutic INR          FINDINGS\IMPRESSION    SINCE THE PRIOR COMPARISON EXAMINATION   THERE IS NO  INTERVAL CHANGE    There is NO identifiable pneumothorax/pneumomediastinum/pneumoperitoneum.  Mild basilar interstitial prominence. No focal lobar consolidative opacity  or meniscus sign to suggest effusion  Mild elevation of the right hemidiaphragm.    Tubes/Lines/Medical Devices:  1.   Sternotomy wires.  2.   LVAD projects over the left ventricular apex.          Electronically Signed by: BRONSON FOX MD   Signed on: 6/3/2025 9:51 AM   Workstation ID: [id number]        Results for orders placed or performed during the hospital encounter of 08/22/24   XR CHEST PA AND LATERAL 2 VIEWS    Narrative    PROCEDURE: XR CHEST PA AND LATERAL 2 VIEWS     HISTORY: Syncopal episode     TECHNIQUE: Two views of the chest obtained in frontal and lateral  projections.    COMPARISON: 4/12/2024    FINDINGS:    Lungs, Airways, & Pleurae: Mild streaky opacities at the left lung base  compatible subsegmental atelectasis and/or scarring. No focal airspace  consolidation. No pleural effusion or pneumothorax.    Heart & Mediastinum: Normal heart size.     Medical Devices: LVAD and median sternotomy wires redemonstrated.    Other Findings: None.       Impression    No acute abnormalities.    Electronically Signed by: VALE SALAS M.D.   Signed on: 8/22/2024 3:10 PM   Workstation ID: [id number]        Echocardiogram:    Results for orders placed or performed during the hospital encounter of 25   TRANSTHORACIC ECHO (TTE) COMPLETE W/ W/O IMAGING AGENT   Result Value Ref Range    AV Stenosis Severity Text Absent     Ejection Fraction 25%     AV VTI (Previously displayed as ARNOLD) 0.78     MV Peak E Velocity 1.00     MV Peak A Velocity 123     E Wave Decelaration Time [id number]     MV E Wave Matheus/E Tissue Matheus Med 4.03     MV E Tissue Matheus Med 9.79     TV Estimated Right Arterial Pressure 7.9     MARINO LVOT Peak Gradient 0.9     LV end diastolic posterior wall thickness 2D 5.3     Left Ventricular Internal Dimension in Diastole 4.6     Left Internal Dimenson in Systole 0.9     Impression    *[facility]*  [address] [address] [phone]  Transthoracic Echocardiogram (TTE)    Patient: Ben Murray    Study Date/Time:         2025 9:04AM  MRN:     [id number]         FIN#:                    [id number]  :     1963       Ht/Wt:                   175cm 99.8kg  Age:     61               BSA/BMI:                 2.23m^2 32.6kg/m^2  Gender:  M                Baseline BP:             83 / 61  *Ordering Physician:* Lexie Davalos     *Attending Physician:* Denise Barajas     *Diagnostic Physician:* Santo Kahn MD  *Sonographer:* JM Valladares     ------------------------------------------------------------------------------  INDICATIONS:   Left ventricular assist device.    ------------------------------------------------------------------------------  STUDY CONCLUSIONS  SUMMARY:    1. Left ventricle: The cavity size is normal. Wall thickness is normal.     Systolic function is severely reduced. The ejection fraction was measured     by biplane method of disks. Left ventricular diastolic function parameters     are indeterminate at present time. A left ventricular assist device (LVAD)     is visualized. The baseline LVAD speed is 5600 rpm. The ejection  fraction     is 25%.  2. Aortic valve: There is trivial regurgitation.  3. Right ventricle: The cavity size is normal. Systolic function is reduced by     RV S'.    ------------------------------------------------------------------------------  STUDY DATA:  Patient room number: 8114-e1. Comparison is made to the study of  03/24/2025.  Procedure:  A transthoracic echocardiogram was performed. Image  quality was adequate.  M-mode, complete 2D, complete spectral Doppler, and  color Doppler.  Study status:  Routine.  Study completion:  There were no  complications.    FINDINGS    LEFT VENTRICLE:  The cavity size is normal. Wall thickness is normal. Systolic  function is severely reduced. Wall motion is normal; there are no regional  wall motion abnormalities. Unable to accurately assess left ventricular  diastolic function parameters due to LVAD. A left ventricular assist device  (LVAD) is visualized. The baseline LVAD speed is 5600 rpm.    The ejection  fraction was measured by biplane method of disks. The ejection fraction is  25%. The tissue Doppler parameters are abnormal. Left ventricular diastolic  function parameters are indeterminate at present time.    AORTIC VALVE:  Not well visualized. The annulus is normal. The valve is  probably trileaflet. The leaflets are normal thickness.  There is no stenosis.    There is trivial regurgitation.  Doppler:  In correlation with the LVAD, the  leaflets do not open.    AORTA:  Aortic root: The root is normal-sized.  Ascending aorta: The vessel is normal-sized.    MITRAL VALVE:  The annulus is normal. The leaflets are normal thickness.  There is no evidence for stenosis.   There is no regurgitation. The peak  diastolic gradient is 2mm Hg.    LEFT ATRIUM:  The atrium is normal in size.    RIGHT VENTRICLE:  The cavity size is normal. Systolic function is reduced by  RV S'.    PULMONIC VALVE:   The leaflets are normal thickness. There is no  regurgitation.    TRICUSPID VALVE:   The valve is structurally normal. There is no regurgitation.    RIGHT ATRIUM:  The atrium is normal in size.    PERICARDIUM:   There is no pericardial effusion.    SYSTEMIC VEINS:  Inferior vena cava: The IVC is normal-sized.  Respirophasic diameter changes  are in the normal range (>= 50%).    ------------------------------------------------------------------------------  Measurements     Left ventricle            Value        Ref       03/24/2025  Left ventricle continued     Value          Ref        03/24/2025   CODY, LAX chord        (N) 5.3   cm     4.2 - 5.8 3.3         E/e', lat sandy, TDI       (N) 8              <=13       26   ESD, LAX chord        (H) 4.6   cm     2.5 - 4.0 2.7         E', med sandy, TDI         (L) 4.0   cm/sec   >=7.0      10.9   CODY/bsa, LAX chord    (N) 2.4   cm/m^2 2.2 - 3.0 1.4         E/e', med sandy, TDI           19             ---------- 7   ESD/bsa, LAX chord    (N) 2.0   cm/m^2 1.3 - 2.1 1.2         E', avg, TDI                 6.91  cm/sec   ---------- 6.865   PW, ED, LAX           (N) 0.9   cm     0.6 - 1.0 ----------  E/e', avg, TDI           (N) 11             <=14       11   CODY major ax, A4C         8.0   cm     --------- 7.8   ESD major ax, A4C         7.3   cm     --------- 7.7         Right ventricle              Value          Ref        03/24/2025   FS major axis, A4C        9     %      --------- 1           CODY, LAX                     2.7   cm       ---------- ----------   CODY/bsa major ax, A4C     3.6   cm/m^2 --------- 3.3         S' lateral               (N) 7.9   cm/sec   6.0 - 13.4 ----------   ESD/bsa major ax, A4C     3.3   cm/m^2 --------- 3.3   SPRING, A4C                  38.5  cm^2   --------- 40.1        Left atrium                  Value          Ref        03/24/2025   TRINIDAD, A4C                  32.1  cm^2   --------- 36.0        AP dim, ES               (H) 4.5   cm       3.0 - 4.0  ----------   FAC, A4C                  17    %      --------- 10          AP  dim index, ES         (N) 2.0   cm/m^2   1.5 - 2.3  ----------   IVS, ED               (N) 0.9   cm     0.6 - 1.0 2.4         Area ES, A4C             (N) 18    cm^2     <=20       16   PW, ED                (N) 0.9   cm     0.6 - 1.0 ----------  Area/bsa ES, A4C             8.1   cm^2/m^2 ---------- 6.95   IVS/PW, ED                0.99         --------- ----------  Area ES, A2C                 19    cm^2     ---------- ----------   EDV                   (N) 147   ml     62 - 150  37          Area/bsa ES, A2C             8.41  cm^2/m^2 ---------- ----------   ESV                   (H) 95    ml     21 - 61   20          SI dim, A2C                  5.1   cm       ---------- ----------   EF                    (L) 25    %      52 - 72   18          Vol, S                   (H) 63    ml       18 - 58    41   SV                        39    ml     --------- 18          Vol/bsa, S               (N) 28    ml/m^2   16 - 34    17   EDV/bsa               (N) 66    ml/m^2 34 - 74   16          Vol, ES, 1-p A4C         (H) 60    ml       18 - 58    40   ESV/bsa               (H) 42    ml/m^2 11 - 31   8           Vol/bsa, ES, 1-p A4C     (N) 27    ml/m^2   12 - 37    17   SV/bsa                    17    ml/m^2 --------- 8           Vol, ES, 1-p A2C         (N) 57    ml       18 - 58    ----------   SV, 1-p A4C               37    ml     --------- 30          Vol/bsa, ES, 1-p A2C     (N) 25    ml/m^2   11 - 43    ----------   SV/bsa, 1-p A4C           17    ml/m^2 --------- 13          Vol, ES, 2-p                 63    ml       ---------- ----------   EDV, 2-p              (H) 151   ml     62 - 150  ----------  Vol/bsa, ES, 2-p         (N) 28    ml/m^2   16 - 34    ----------   ESV, 2-p              (H) 114   ml     21 - 61   134   EF, 2-p               (L) 25    %      52 - 72   ----------  Mitral valve                 Value          Ref        03/24/2025   SV, 2-p                   37    ml     --------- ----------  Peak E                        0.78  m/sec    ---------- 0.73   EDV/bsa, 2-p          (N) 68    ml/m^2 34 - 74   ----------  Peak A                       1     m/sec    ---------- 1.18   ESV/bsa, 2-p          (H) 51    ml/m^2 11 - 31   57          Decel time                   123   ms       ---------- 241   SV/bsa, 2-p               16.6  ml/m^2 --------- ----------  Peak grad, D                 2     mm Hg    ---------- 2   E', lat sandy, TDI      (L) 9.8   cm/sec >=10.0    2.8         Peak E/A ratio               0.8            ---------- 0.6  Legend:  (L)  and  (H)  rah values outside specified reference range.    (N)  marks values inside specified reference range.    Prepared and electronically signed by  Santo Kahn MD  06/02/2025 10:40       Right Heart Catheterization:  Results for orders placed or performed during the hospital encounter of 02/16/24   Cath/PV Case    Narrative    Cardiac Catheterization Laboratory Report    The patient is a 60 year old M with HTN, HLD, and recent late STEMI with   cardiogenic shock s/p PCI to LAD and D1 and IABP placement on 2-   who had a VT/VF arrest this morning, referred for coronary angiogram for   further evaluation.  Of note, patient has an LV thrombus on his most   recent echocardiogram 2-17 and an EF 17%.       I discussed the risks, benefits, and alternatives of angiography and   revascularization.   The risks include, but are not limited to:  stroke,   heart attack, death, arrhythmia, renal failure, bleeding, transfusion,   site infection, arterial pseudoaneurysm formation, arterio-venous fistula   formation, limb and organ ischemia, peripheral embolization and the like.    I also discussed the possible outcomes including:  normal findings,   disease treated with medical therapy alone, disease amenable to   percutaneous  intervention, and disease needing surgical    revascularization.  The use of bare metal stents and drug eluting   stents/balloons was discussed,  including the rationale and length of   treatment with antiplatelet medications.  The patient was informed that   additional, unanticipated procedures may be necessary during the   procedure, based on my clinical judgement.  I discussed the possibility of   using a closure device to close the arteriotomy if a femoral artery   approach is used.  I outlined the risks associated with closure device   use, including, but not limited to:  bleeding, site infection, arterial   pseudoaneurysm formation, arterio-venous fistula formation, arterial   injury, and clot formation.  I provided the patient with an opportunity to   ask questions.  The patient gave informed, written consent.  Based on   procedural findings, I will make further recommendations.      Narrative Procedure:  Witnessed informed consent was obtained, and the patient was transported   to the cardiac catheterization lab where a \"Time Out\" was performed.     Patient was intubated and sedated on arrival to the cath lab. After the   usual sterile prep and drape, the right radial site was locally   anesthetized and a 6F Slender sheath was inserted into the right radial   artery using the Seldinger technique.  Intra-arterial heparin administered   to prevent radial artery occlusion.      Using 6F JL-3.5 diagnostic catheters, selective coronary angiography was   performed in multiple projections.  All catheter and sheath exchanges were   performed using a guidewire.      The IABP was noted to be low, so we paused it and advanced it under   fluoroscopic guidance to the proximal descending aorta, then turned back   on.     The radial puncture was uneventfully closed with application of band   providing patent hemostasis.      Procedure performed:  Right transradial artery access and closure with transradial band   placement for patent hemostasis  Diagnostic coronary angiography of the LCA  Moderate conscious sedation for 4 minutes, by administration of fentanyl   and  midazolam for sedation and patient comfort, with continuous EKG, pulse   oximetry and non-invasive and invasive blood pressure monitoring.  Please   refer to nursing documentation for sedation dose administered.       Findings:  Widely patent stents in LAD and D1, with MARLI 2-3 flow.       Recommendations:  Post cath care per usual protocol.  TR band to stay on for 1 hour.   Patient likely needs more advanced mechanical support such as ECMO, Tandem   Heart (LA-FA), or LVAD evaluation.          Procedure performed by Dr Sandi Monson MD - Interventional Cardiology Fellow      The results were reviewed with the referring physicians and the   patient/family. The patient tolerated the procedure well without immediate   complications.          IMPRESSION AND PLAN:    Chronic systolic heart failure -  ICM HFrEF s/p HM3 on 3/2024 as Destination Therapy (DT) due to  ILD, MRSA/PSAR DL Infection and CKD .   - Etiology: ICM  - NYHA class: NYHA III - IV  - Cardio Diagnostics:   - TTE 6/2/2025: Personally reviewed. EF 25%, LV has severely reduced systolic function, baseline RPM 5600, AoV has trivial regurgitation. Reduced systolic function in RV.   - LVAD: LVAD function stable. See VAD Interrogation above.   - Speed Change: None    - Volume Status: Appears euvolemic on no diuretic therapy  - Anticoag: INR .   - Hold Coumadin, No ASA d/t    - LDH: Stable. No signs of hemolysis  - MAP/BP: MAPs below goal. . Goal MAP > 90.    GDMT  - Beta Blocker: None  - ACE/ARB/ARNI: Holding Entresto  mg  - MRA: Holding Spironolactone 25 mg   - Diuretic: None  - SGLT-2: None  - Vasodilator/Nitrates: None  - RV Support: None  - Other: Lipitor 40 mg, Holding ASA 81 mg,   - Antiarrhythmic: None  - Inotropes: None    - Cardiac Device Therapy: None    Tachycardia  Hx of Recurrent post VAD VT  - ECG reviewed narrow complex 6/1/25  - Not on arrhyhtmia therapies  - In SB  - Previously on Amiodarone 200 mg QD however this was dc'd during  8/22/24 hospitaliation d/t thyrotoxicosis.     Acute Subarachnoid hemorrhage   - no neuro interventions planned   - Follow-up head CT scan 6/2/25 similar cortical subarachnoid hemorrhage.    Hx of CAD s/p STEMI s/p thrombectomy/PCI  - not on coumadin dosing d/t  supratherapeutic INR.   - TTE 6/2/25: no stenosis, AoV has trivial regurgitation    Tobacco Abuse  -  Last known administration 4/21/2025  - mgmt per primary   - cessation recommended    AMS  - cEEG abnormal;  9/4-6/5 (discontinued by pt) encephalopathic during the recording. Moderate  generalized slowing. Focal slowing over the left FC head region.  - Repeat CTH negative for SAH expansion  - Continue Keppra   - neuro following, continued recs appreciated  - CT Head 6/3/25: Small volume acute left perirolandic subarachnoid hemorrhage has not  significantly changed. 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 Additionally, previously per ID WBC does not seem to correlate and he has leukocytosis even when infection is under control     TODAY'S PLAN:  - TTE 6/2/25: EF 25%, LV has severely reduced systolic function. AoV has trivial regurgitation. RV has reduced systolic function.   - CT Head 6/3/25: Small volume acute left perirolandic subarachnoid hemorrhage has not  significantly changed. Mild edema of the adjacent left pre and postcentral    - VAD interrogation stable. No changes to VAD settings.  - Undergoing IR CAROTID 6/5/25  - Repeat T4 lvl on 6/7/25 per endocrinology. Continue hydrocortisone per primary team  -- WBC 18.2 -> 19.6>> 17.5, Bcx +(gram- bacilli), Continue gentamicin drops, meropenem , vancomycin abx. Continued ID recs appreciated  - Hold Entresto  mg BID  - Hold Spironolactone 25 mg QD  - Hold  Coumadin and ASA  - Continue Lipitor 40 mg   - Per AMS, neuro sx following, continued recs appreciated. Follow-up head CT scan 6/2/25 similar cortical subarachnoid hemorrhage. CTA no clear vascular abnormality.  - palliative following per depressive events affecting compliance  - Continue Keppra.  -  cEEG 9/4-6/5 (discontinued by pt) encephalopathic during the recording. Moderate  generalized slowing. Focal slowing over the left FC head region.  - PT/OT/ST      Charting performed by michael Chaparro for Dr. Ramo Guaman.     The documentation recorded by the scribe accurately and completely reflects the service(s) I personally performed and the decisions made by me.      Ramo Guaman    35 minutes critical care time spent evaluating, managing, and providing care to this patient including direct patient care at bedside as well as time spent reviewing test results, discussing the case with consultants or family members, and documenting in the patient's chart    Patient is a 61-year-old male with history of severe coronary disease with severe chronic systolic heart failure status post HeartMate 3 LVAD presenting with acute intracranial hemorrhage.  Patient remains with right-sided weakness, seen by neurosurgery, no surgical intervention indicated.  Anticoagulation reversed.  Will continue to hold anticoagulation.  Neuro following for BP management as well as further recs.  6/3/25 patient became acutely unresponsive.  Therefore sent for repeat CT head showing Small volume acute left perirolandic subarachnoid hemorrhage has not significantly changed. Mild edema of the adjacent left pre and postcentral gyri .    NCCU was consulted and recommended EEG and to continue Keppra.      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  Continue to hold anticoagulation.   Monitor clinical status.  PT/OT.  Planning cerebral angiogram with IR today.     Today patient remains more awake and interactive.  LVAD parameters are stable.  Vital stable.    At this time patient remains high acuity and critically ill due to severe cardiomyopathy status post HeartMate 3 presenting with life-threatening subarachnoid hemorrhage at high risk for deterioration and death.    Ramo Guaman MD [facility]  Advanced HF/LVAD/Transplant/Pulmonary HTN Cardiology  [facility]

## 2025-08-23 DIAGNOSIS — M79.18 MUSCLE PAIN, LUMBAR: ICD-10-CM

## 2025-08-23 DIAGNOSIS — E78.2 MIXED HYPERLIPIDEMIA: ICD-10-CM

## 2025-08-25 RX ORDER — METHOCARBAMOL 500 MG/1
500 TABLET, FILM COATED ORAL 2 TIMES DAILY
Qty: 180 TABLET | Refills: 3 | Status: SHIPPED | OUTPATIENT
Start: 2025-08-25

## 2025-08-25 RX ORDER — ATORVASTATIN CALCIUM 20 MG/1
20 TABLET, FILM COATED ORAL NIGHTLY
Qty: 90 TABLET | Refills: 3 | Status: SHIPPED | OUTPATIENT
Start: 2025-08-25

## 2025-08-27 ENCOUNTER — OFFICE VISIT (OUTPATIENT)
Dept: FAMILY MEDICINE CLINIC | Facility: CLINIC | Age: 65
End: 2025-08-27
Payer: MEDICARE

## 2025-08-27 VITALS
HEART RATE: 74 BPM | DIASTOLIC BLOOD PRESSURE: 66 MMHG | BODY MASS INDEX: 26.4 KG/M2 | HEIGHT: 64 IN | OXYGEN SATURATION: 98 % | SYSTOLIC BLOOD PRESSURE: 122 MMHG | WEIGHT: 154.6 LBS | RESPIRATION RATE: 18 BRPM

## 2025-08-27 DIAGNOSIS — Z00.00 MEDICARE ANNUAL WELLNESS VISIT, SUBSEQUENT: ICD-10-CM

## 2025-08-27 DIAGNOSIS — Z23 IMMUNIZATION DUE: ICD-10-CM

## 2025-08-27 DIAGNOSIS — R10.30 LOWER ABDOMINAL PAIN: ICD-10-CM

## 2025-08-27 DIAGNOSIS — Z13.820 ENCOUNTER FOR SCREENING FOR OSTEOPOROSIS: ICD-10-CM

## 2025-08-27 DIAGNOSIS — Z12.11 ENCOUNTER FOR SCREENING FOR MALIGNANT NEOPLASM OF COLON: ICD-10-CM

## 2025-08-27 DIAGNOSIS — Z23 NEED FOR PNEUMOCOCCAL VACCINATION: ICD-10-CM

## 2025-08-27 DIAGNOSIS — R73.9 HYPERGLYCEMIA: Primary | ICD-10-CM

## 2025-08-27 LAB
BILIRUB BLD-MCNC: NEGATIVE MG/DL
CLARITY, POC: CLEAR
COLOR UR: YELLOW
GLUCOSE UR STRIP-MCNC: NEGATIVE MG/DL
KETONES UR QL: NEGATIVE
LEUKOCYTE EST, POC: NEGATIVE
NITRITE UR-MCNC: NEGATIVE MG/ML
PH UR: 5.5 [PH] (ref 5–8)
PROT UR STRIP-MCNC: NEGATIVE MG/DL
RBC # UR STRIP: NEGATIVE /UL
SP GR UR: 1.01 (ref 1–1.03)
UROBILINOGEN UR QL: NORMAL

## 2025-08-27 RX ORDER — ONDANSETRON 4 MG/1
4 TABLET, FILM COATED ORAL EVERY 8 HOURS PRN
Qty: 10 TABLET | Refills: 2 | Status: SHIPPED | OUTPATIENT
Start: 2025-08-27

## 2025-08-28 LAB
ALBUMIN/CREAT UR: <8 MG/G CREAT (ref 0–29)
CREAT UR-MCNC: 35.9 MG/DL
MICROALBUMIN UR-MCNC: <3 UG/ML